# Patient Record
Sex: FEMALE | Race: WHITE | NOT HISPANIC OR LATINO | Employment: UNEMPLOYED | URBAN - NONMETROPOLITAN AREA
[De-identification: names, ages, dates, MRNs, and addresses within clinical notes are randomized per-mention and may not be internally consistent; named-entity substitution may affect disease eponyms.]

---

## 2018-01-24 ENCOUNTER — OFFICE VISIT (OUTPATIENT)
Dept: INTERNAL MEDICINE | Facility: CLINIC | Age: 53
End: 2018-01-24

## 2018-01-24 VITALS
BODY MASS INDEX: 23.95 KG/M2 | WEIGHT: 158 LBS | TEMPERATURE: 98.3 F | DIASTOLIC BLOOD PRESSURE: 62 MMHG | OXYGEN SATURATION: 98 % | SYSTOLIC BLOOD PRESSURE: 118 MMHG | HEART RATE: 82 BPM | HEIGHT: 68 IN

## 2018-01-24 DIAGNOSIS — Z12.31 SCREENING MAMMOGRAM, ENCOUNTER FOR: ICD-10-CM

## 2018-01-24 DIAGNOSIS — L70.8 OTHER ACNE: ICD-10-CM

## 2018-01-24 DIAGNOSIS — R13.14 PHARYNGOESOPHAGEAL DYSPHAGIA: ICD-10-CM

## 2018-01-24 DIAGNOSIS — E55.9 VITAMIN D DEFICIENCY: ICD-10-CM

## 2018-01-24 DIAGNOSIS — E78.2 MIXED HYPERLIPIDEMIA: Primary | ICD-10-CM

## 2018-01-24 DIAGNOSIS — Z76.89 ENCOUNTER TO ESTABLISH CARE: ICD-10-CM

## 2018-01-24 PROCEDURE — 99203 OFFICE O/P NEW LOW 30 MIN: CPT | Performed by: INTERNAL MEDICINE

## 2018-01-24 RX ORDER — FLUTICASONE PROPIONATE 50 MCG
SPRAY, SUSPENSION (ML) NASAL
Refills: 0 | COMMUNITY
Start: 2018-01-19 | End: 2018-02-14 | Stop reason: HOSPADM

## 2018-01-24 RX ORDER — TRETINOIN 0.1 MG/G
GEL TOPICAL NIGHTLY
COMMUNITY
End: 2020-11-19

## 2018-01-24 RX ORDER — SPIRONOLACTONE 25 MG/1
25 TABLET ORAL DAILY
COMMUNITY
End: 2022-05-18 | Stop reason: DRUGHIGH

## 2018-01-24 RX ORDER — AMOXICILLIN AND CLAVULANATE POTASSIUM 875; 125 MG/1; MG/1
TABLET, FILM COATED ORAL
Refills: 0 | COMMUNITY
Start: 2018-01-19 | End: 2018-02-14

## 2018-01-24 NOTE — PROGRESS NOTES
"Chief Complaint   Patient presents with   • Establish Care     with Dr. Vermeesch today. Would like referral to derm.     Subjective   Tere Fajardo is a 52 y.o. female.     HPI Comments: Here to be established today.   PMH of HLD, vit D def and acne.  PSH of D&C.  Meds, allergies, SH and FH reveiwed.   Pt sees Dr Love for pap and breast exam.   HCM: last pap in 2016, mammo 2016 .  She is on aldactone and retin-A for cystic acne.  She does not always use the retin-A.  She goes to [facility] in [place] for this.   She does not follow a HH diet.  Has not been exercising recently.    She has had difficulty swallowing meats in the past, after she swallowed first bite, it became easier.  She cut back on meats and this problem resolved.  She started to eat meats again.  Now she is having episodes again with difficulty swallowing. It is now occurring with meat or soda.  She denies any GERD sxs.           The following portions of the patient's history were reviewed and updated as appropriate: allergies, current medications, past family history, past medical history, past social history, past surgical history and problem list.    Review of Systems   HENT: Positive for trouble swallowing.    Musculoskeletal:        Occasional leg cramps   Skin:        Mild facial acne   All other systems reviewed and are negative.      Objective   /62  Pulse 82  Temp 98.3 °F (36.8 °C)  Ht 172.7 cm (68\")  Wt 71.7 kg (158 lb)  SpO2 98%  BMI 24.02 kg/m2  Body mass index is 24.02 kg/(m^2).  Physical Exam   Constitutional: She is oriented to person, place, and time. She appears well-developed and well-nourished.   HENT:   Head: Normocephalic and atraumatic.   Right Ear: External ear normal.   Left Ear: External ear normal.   Mouth/Throat: Oropharynx is clear and moist.   Eyes: Conjunctivae and EOM are normal. Pupils are equal, round, and reactive to light.   Neck: Normal range of motion. Neck supple. No thyromegaly " present.   Cardiovascular: Normal rate, regular rhythm, normal heart sounds and intact distal pulses.    No murmur heard.  Pulmonary/Chest: Effort normal and breath sounds normal. No respiratory distress.   Abdominal: Soft. Bowel sounds are normal.   Musculoskeletal: She exhibits no edema.   Lymphadenopathy:     She has no cervical adenopathy.   Neurological: She is alert and oriented to person, place, and time. She has normal reflexes. No cranial nerve deficit.   Psychiatric: She has a normal mood and affect. Judgment normal.   Nursing note and vitals reviewed.      Assessment/Plan   Tere Fajardo is here today and the following problems have been addressed:      Tere was seen today for establish care.    Diagnoses and all orders for this visit:    Mixed hyperlipidemia  -     Comprehensive Metabolic Panel  -     Lipid Panel    Vitamin D deficiency  -     Vitamin D 25 Hydroxy    Other acne  -     Ambulatory Referral to Dermatology    Encounter to establish care    Pharyngoesophageal dysphagia  -     Ambulatory Referral to Gastroenterology  -     CBC & Differential  -     TSH    Screening mammogram, encounter for  -     Mammo Screening Digital Tomosynthesis Bilateral With CAD; Future    Follow heart healthy/low cholesterol diet  Avoid processed/fried foods/fast food  Exercise as tolerated up to 30 minutes 5 days a week  Take all medications as prescribed  Labs as noted  Increase hydration due to leg cramps, if not helpful take magnesium 250 mg q day  Referred for mammogram, she will schedule  Referred to derm for follow up  Referred to Dr Ndiaye for dysphagia sxs    RTC 6 mo or prn    Please note that portions of this note were completed with a voice recognition program.  Efforts were made to edit dictation, but occasionally words are mistranscribed.

## 2018-01-30 LAB
25(OH)D3+25(OH)D2 SERPL-MCNC: 44 NG/ML
ALBUMIN SERPL-MCNC: 4.3 G/DL (ref 3.5–5)
ALBUMIN/GLOB SERPL: 1.5 G/DL (ref 1–2)
ALP SERPL-CCNC: 71 U/L (ref 38–126)
ALT SERPL-CCNC: 47 U/L (ref 13–69)
AST SERPL-CCNC: 19 U/L (ref 15–46)
BASOPHILS # BLD AUTO: 0.04 10*3/MM3 (ref 0–0.2)
BASOPHILS NFR BLD AUTO: 0.5 % (ref 0–2.5)
BILIRUB SERPL-MCNC: 0.8 MG/DL (ref 0.2–1.3)
BUN SERPL-MCNC: 16 MG/DL (ref 7–20)
BUN/CREAT SERPL: 20 (ref 7.1–23.5)
CALCIUM SERPL-MCNC: 10.3 MG/DL (ref 8.4–10.2)
CHLORIDE SERPL-SCNC: 104 MMOL/L (ref 98–107)
CHOLEST SERPL-MCNC: 199 MG/DL (ref 0–199)
CO2 SERPL-SCNC: 28 MMOL/L (ref 26–30)
CREAT SERPL-MCNC: 0.8 MG/DL (ref 0.6–1.3)
EOSINOPHIL # BLD AUTO: 0.15 10*3/MM3 (ref 0–0.7)
EOSINOPHIL NFR BLD AUTO: 2 % (ref 0–7)
ERYTHROCYTE [DISTWIDTH] IN BLOOD BY AUTOMATED COUNT: 12.2 % (ref 11.5–14.5)
GFR SERPLBLD CREATININE-BSD FMLA CKD-EPI: 76 ML/MIN/1.73
GFR SERPLBLD CREATININE-BSD FMLA CKD-EPI: 92 ML/MIN/1.73
GLOBULIN SER CALC-MCNC: 2.9 GM/DL
GLUCOSE SERPL-MCNC: 86 MG/DL (ref 74–98)
HCT VFR BLD AUTO: 41.5 % (ref 37–47)
HDLC SERPL-MCNC: 44 MG/DL (ref 40–60)
HGB BLD-MCNC: 14.1 G/DL (ref 12–16)
IMM GRANULOCYTES # BLD: 0.02 10*3/MM3 (ref 0–0.06)
IMM GRANULOCYTES NFR BLD: 0.3 % (ref 0–0.6)
LDLC SERPL CALC-MCNC: 122 MG/DL (ref 0–99)
LYMPHOCYTES # BLD AUTO: 2.81 10*3/MM3 (ref 0.6–3.4)
LYMPHOCYTES NFR BLD AUTO: 37 % (ref 10–50)
MCH RBC QN AUTO: 31.1 PG (ref 27–31)
MCHC RBC AUTO-ENTMCNC: 34 G/DL (ref 30–37)
MCV RBC AUTO: 91.6 FL (ref 81–99)
MONOCYTES # BLD AUTO: 0.4 10*3/MM3 (ref 0–0.9)
MONOCYTES NFR BLD AUTO: 5.3 % (ref 0–12)
NEUTROPHILS # BLD AUTO: 4.17 10*3/MM3 (ref 2–6.9)
NEUTROPHILS NFR BLD AUTO: 54.9 % (ref 37–80)
NRBC BLD AUTO-RTO: 0 /100 WBC (ref 0–0)
PLATELET # BLD AUTO: 318 10*3/MM3 (ref 130–400)
POTASSIUM SERPL-SCNC: 4.6 MMOL/L (ref 3.5–5.1)
PROT SERPL-MCNC: 7.2 G/DL (ref 6.3–8.2)
RBC # BLD AUTO: 4.53 10*6/MM3 (ref 4.2–5.4)
SODIUM SERPL-SCNC: 144 MMOL/L (ref 137–145)
TRIGL SERPL-MCNC: 164 MG/DL
TSH SERPL DL<=0.005 MIU/L-ACNC: 2.62 MIU/ML (ref 0.47–4.68)
VLDLC SERPL CALC-MCNC: 32.8 MG/DL
WBC # BLD AUTO: 7.59 10*3/MM3 (ref 4.8–10.8)

## 2018-02-14 ENCOUNTER — HOSPITAL ENCOUNTER (OUTPATIENT)
Dept: MAMMOGRAPHY | Facility: HOSPITAL | Age: 53
Discharge: HOME | End: 2018-02-14
Admitting: INTERNAL MEDICINE

## 2018-02-14 ENCOUNTER — OFFICE VISIT (OUTPATIENT)
Dept: GASTROENTEROLOGY | Facility: CLINIC | Age: 53
End: 2018-02-14

## 2018-02-14 VITALS
SYSTOLIC BLOOD PRESSURE: 110 MMHG | BODY MASS INDEX: 23.95 KG/M2 | HEART RATE: 89 BPM | TEMPERATURE: 98.4 F | WEIGHT: 158 LBS | DIASTOLIC BLOOD PRESSURE: 80 MMHG | HEIGHT: 68 IN | OXYGEN SATURATION: 99 %

## 2018-02-14 DIAGNOSIS — R13.19 ESOPHAGEAL DYSPHAGIA: Primary | ICD-10-CM

## 2018-02-14 DIAGNOSIS — Z12.31 SCREENING MAMMOGRAM, ENCOUNTER FOR: ICD-10-CM

## 2018-02-14 PROCEDURE — 99243 OFF/OP CNSLTJ NEW/EST LOW 30: CPT | Performed by: INTERNAL MEDICINE

## 2018-02-14 PROCEDURE — 77067 SCR MAMMO BI INCL CAD: CPT

## 2018-02-14 PROCEDURE — 77063 BREAST TOMOSYNTHESIS BI: CPT

## 2018-02-14 NOTE — PROGRESS NOTES
PCP: Marilyn K. Vermeesch, MD    Chief Complaint   Patient presents with   • Difficulty Swallowing       History of Present Illness:   HPI  I appreciate the consult for swallowing difficulty. Ms. Fajardo is a 52-year-old with a history of hyperlipidemia.  She states that since 2004 she will occasionally experience an episode of difficult swallowing.  This occurred last week and eventually the food went down.  The patient primarily will have a problem with certain meat as well as bread.  She denies any difficulty with liquids.  There is no history of progressive dysphagia.  The patient denies any unexplained weight loss.  Ms. Fajardo states that she rarely has faye heartburn.  She does not take any medication such as Prilosec or Zantac.  The patient denies taking Tums on a regular basis.  She does have a history of some recent issues with sinus infections.  The patient does admit to taking Claritin for a while several years ago.  There is no family history of esophageal cancer.  There is no family history of gastrointestinal cancer.  Past Medical History:   Diagnosis Date   • Hyperlipidemia        Past Surgical History:   Procedure Laterality Date   • D&C CERVICAL BIOPSY     • ENDOSCOPY  2004         Current Outpatient Prescriptions:   •  spironolactone (ALDACTONE) 50 MG tablet, Take 50 mg by mouth Daily., Disp: , Rfl:   •  tretinoin (RETIN-A) 0.01 % gel, Apply  topically Every Night., Disp: , Rfl:     No Known Allergies    Family History   Problem Relation Age of Onset   • Arthritis Mother    • Hiatal hernia Mother    • Heart attack Father        Social History     Social History   • Marital status:      Spouse name: N/A   • Number of children: N/A   • Years of education: N/A     Occupational History   • Not on file.     Social History Main Topics   • Smoking status: Former Smoker   • Smokeless tobacco: Never Used   • Alcohol use Yes   • Drug use: No   • Sexual activity: Defer     Other Topics Concern   • Not  on file     Social History Narrative       Review of Systems   Constitutional: Negative for activity change, appetite change, fatigue, fever and unexpected weight change.   HENT: Positive for trouble swallowing. Negative for dental problem, hearing loss, mouth sores, postnasal drip, sneezing and voice change.    Eyes: Negative for pain, redness, itching and visual disturbance.   Respiratory: Negative for cough, choking, chest tightness, shortness of breath and wheezing.    Cardiovascular: Negative for chest pain, palpitations and leg swelling.   Gastrointestinal: Negative for abdominal distention, abdominal pain, anal bleeding, blood in stool, constipation, diarrhea, nausea, rectal pain and vomiting.   Endocrine: Negative for cold intolerance, heat intolerance, polydipsia, polyphagia and polyuria.   Genitourinary: Negative.  Negative for dysuria, enuresis, flank pain, hematuria and urgency.   Musculoskeletal: Negative for arthralgias, back pain, gait problem, joint swelling and myalgias.   Skin: Negative for color change, pallor and rash.   Allergic/Immunologic: Negative for environmental allergies, food allergies and immunocompromised state.   Neurological: Negative for dizziness, tremors, seizures, facial asymmetry, speech difficulty, numbness and headaches.   Hematological: Negative for adenopathy.   Psychiatric/Behavioral: Negative for behavioral problems, confusion, dysphoric mood, hallucinations and self-injury.       Vitals:    02/14/18 1228   BP: 110/80   Pulse: 89   Temp: 98.4 °F (36.9 °C)   SpO2: 99%       Physical Exam   Constitutional: She is oriented to person, place, and time. She appears well-nourished. No distress.   HENT:   Head: Normocephalic and atraumatic.   Mouth/Throat: Oropharynx is clear and moist. No oropharyngeal exudate.   Eyes: Conjunctivae and EOM are normal. No scleral icterus.   Neck: Neck supple. No thyromegaly present.   Cardiovascular: Normal rate, regular rhythm and normal heart  sounds.  Exam reveals no gallop.    No murmur heard.  Pulmonary/Chest: Effort normal and breath sounds normal. She has no wheezes. She has no rales.   Abdominal: Soft. Bowel sounds are normal. There is no tenderness. There is no rebound and no guarding.   Musculoskeletal: Normal range of motion. She exhibits no edema or tenderness.   Lymphadenopathy:     She has no cervical adenopathy.   Neurological: She is alert and oriented to person, place, and time. She exhibits normal muscle tone.   Skin: Skin is dry. No rash noted. No erythema.   Psychiatric: She has a normal mood and affect. Her behavior is normal. Thought content normal.   Vitals reviewed.      Tere was seen today for difficulty swallowing.    Diagnoses and all orders for this visit:    Esophageal dysphagia  -     Esophagogastroduodenoscopy; Future  The clinical history suggest the possibility of Schatzki's ring, reflux-related stricture or eosinophilic esophagitis.      Plan: Proceed with an EGD for further evaluation. Discussed the risks and benefits and she agrees to proceed.      I spent over 50% of the office visit counseling and answering questions from the patient.

## 2018-02-26 ENCOUNTER — OUTSIDE FACILITY SERVICE (OUTPATIENT)
Dept: GASTROENTEROLOGY | Facility: CLINIC | Age: 53
End: 2018-02-26

## 2018-02-26 ENCOUNTER — LAB REQUISITION (OUTPATIENT)
Dept: LAB | Facility: HOSPITAL | Age: 53
End: 2018-02-26

## 2018-02-26 DIAGNOSIS — K20.90 ESOPHAGITIS: ICD-10-CM

## 2018-02-26 DIAGNOSIS — K44.9 DIAPHRAGMATIC HERNIA WITHOUT OBSTRUCTION OR GANGRENE: ICD-10-CM

## 2018-02-26 DIAGNOSIS — R13.14 DYSPHAGIA, PHARYNGOESOPHAGEAL PHASE: ICD-10-CM

## 2018-02-26 PROCEDURE — 88305 TISSUE EXAM BY PATHOLOGIST: CPT | Performed by: INTERNAL MEDICINE

## 2018-02-26 PROCEDURE — 43239 EGD BIOPSY SINGLE/MULTIPLE: CPT | Performed by: INTERNAL MEDICINE

## 2018-02-26 PROCEDURE — 43249 ESOPH EGD DILATION <30 MM: CPT | Performed by: INTERNAL MEDICINE

## 2018-02-26 PROCEDURE — 88313 SPECIAL STAINS GROUP 2: CPT | Performed by: INTERNAL MEDICINE

## 2018-02-27 LAB
CYTO UR: NORMAL
LAB AP CASE REPORT: NORMAL
LAB AP CLINICAL INFORMATION: NORMAL
LAB AP SPECIAL STAINS: NORMAL
Lab: NORMAL
PATH REPORT.FINAL DX SPEC: NORMAL
PATH REPORT.GROSS SPEC: NORMAL

## 2018-03-06 ENCOUNTER — TELEPHONE (OUTPATIENT)
Dept: GASTROENTEROLOGY | Facility: CLINIC | Age: 53
End: 2018-03-06

## 2018-03-06 NOTE — TELEPHONE ENCOUNTER
Called patient back. Gave her biopsy results. Patient would like for Dr Newman to send in a PPI to her pharmacy.

## 2018-03-06 NOTE — TELEPHONE ENCOUNTER
----- Message from Fox Newman MD sent at 3/5/2018  4:55 PM EST -----  Let Mrs. Fajardo know there were some changes of reflux.  There was no evidence for Duke's esophagus.  I would like for her to take a proton pump inhibitor daily.  Thank you,  LIZETH

## 2018-03-07 DIAGNOSIS — K21.00 GASTROESOPHAGEAL REFLUX DISEASE WITH ESOPHAGITIS: Primary | ICD-10-CM

## 2018-03-07 RX ORDER — OMEPRAZOLE 40 MG/1
40 CAPSULE, DELAYED RELEASE ORAL DAILY
Qty: 30 CAPSULE | Refills: 2 | Status: SHIPPED | OUTPATIENT
Start: 2018-03-07 | End: 2018-05-31 | Stop reason: SDUPTHER

## 2018-05-31 DIAGNOSIS — K21.00 GASTROESOPHAGEAL REFLUX DISEASE WITH ESOPHAGITIS: ICD-10-CM

## 2018-05-31 RX ORDER — OMEPRAZOLE 40 MG/1
CAPSULE, DELAYED RELEASE ORAL
Qty: 30 CAPSULE | Refills: 2 | Status: SHIPPED | OUTPATIENT
Start: 2018-05-31 | End: 2018-06-08

## 2018-06-08 ENCOUNTER — OFFICE VISIT (OUTPATIENT)
Dept: INTERNAL MEDICINE | Facility: CLINIC | Age: 53
End: 2018-06-08

## 2018-06-08 VITALS
OXYGEN SATURATION: 98 % | HEART RATE: 88 BPM | DIASTOLIC BLOOD PRESSURE: 66 MMHG | WEIGHT: 155.75 LBS | BODY MASS INDEX: 23.61 KG/M2 | RESPIRATION RATE: 14 BRPM | TEMPERATURE: 99.2 F | SYSTOLIC BLOOD PRESSURE: 108 MMHG | HEIGHT: 68 IN

## 2018-06-08 DIAGNOSIS — N39.0 URINARY TRACT INFECTION WITHOUT HEMATURIA, SITE UNSPECIFIED: Primary | ICD-10-CM

## 2018-06-08 LAB
BILIRUB BLD-MCNC: ABNORMAL MG/DL
CLARITY, POC: ABNORMAL
COLOR UR: ABNORMAL
GLUCOSE UR STRIP-MCNC: NEGATIVE MG/DL
KETONES UR QL: NEGATIVE
LEUKOCYTE EST, POC: NEGATIVE
NITRITE UR-MCNC: POSITIVE MG/ML
PH UR: 5 [PH] (ref 5–8)
PROT UR STRIP-MCNC: ABNORMAL MG/DL
RBC # UR STRIP: NEGATIVE /UL
SP GR UR: 1.02 (ref 1–1.03)
UROBILINOGEN UR QL: ABNORMAL

## 2018-06-08 PROCEDURE — 81003 URINALYSIS AUTO W/O SCOPE: CPT | Performed by: NURSE PRACTITIONER

## 2018-06-08 PROCEDURE — 99213 OFFICE O/P EST LOW 20 MIN: CPT | Performed by: NURSE PRACTITIONER

## 2018-06-08 RX ORDER — NITROFURANTOIN 25; 75 MG/1; MG/1
100 CAPSULE ORAL 2 TIMES DAILY
Qty: 14 CAPSULE | Refills: 0 | Status: SHIPPED | OUTPATIENT
Start: 2018-06-08 | End: 2018-06-15

## 2018-06-08 RX ORDER — FLUCONAZOLE 150 MG/1
150 TABLET ORAL ONCE
Qty: 1 TABLET | Refills: 0 | Status: SHIPPED | OUTPATIENT
Start: 2018-06-08 | End: 2018-06-08

## 2018-06-08 NOTE — PROGRESS NOTES
Chief Complaint / Reason:      Chief Complaint   Patient presents with   • Urinary Urgency     took some Azo which has helped.    • Back Pain     some back ache       Subjective     HPI  Presents today with complaints of urinary urgency, frequency and back pain.  Denies fever or chills and states symptoms started yesterday.  She states that she has been at the hospital with her daughter who delivered baby at [facility].  She states that normally she does drink water fairly well but has not been and has been doing a lot of sitting and not taking care of herself.  Denies blood in urine or stool.  Denies constipation diarrhea or nausea.  Patient states that she has tried some Azo tablets which have helped with the pain and tingling.  Vital signs are stable with exception of low-grade fever.  History taken from: patient    PMH/FH/Social History were reviewed and updated appropriately in the electronic medical record.     Review of Systems:   Review of Systems   Constitutional: Positive for fatigue.   Respiratory: Negative.    Cardiovascular: Negative.    Gastrointestinal: Positive for abdominal pain.   Genitourinary: Positive for difficulty urinating, dysuria, frequency and urgency.     All other systems were reviewed and are negative.  Exceptions are noted in the subjective or above.      Objective     Vital Signs  Vitals:    06/08/18 1323   BP: 108/66   Pulse: 88   Resp: 14   Temp: 99.2 °F (37.3 °C)   SpO2: 98%       Body mass index is 23.68 kg/m².    Physical Exam   Constitutional: She is oriented to person, place, and time. She appears well-developed and well-nourished.   HENT:   Head: Normocephalic.   Right Ear: External ear normal.   Left Ear: External ear normal.   Cardiovascular: Normal rate, regular rhythm, normal heart sounds and intact distal pulses.    Pulmonary/Chest: Effort normal and breath sounds normal.   Abdominal: Soft. Bowel sounds are normal. There is tenderness in the suprapubic  area. There is no CVA tenderness.   Neurological: She is alert and oriented to person, place, and time.   Skin: Skin is warm and dry. Capillary refill takes less than 2 seconds.   Psychiatric: She has a normal mood and affect. Her behavior is normal. Judgment and thought content normal.   Nursing note and vitals reviewed.       Results Review:    I reviewed the patient's new clinical results.   Office Visit on 06/08/2018   Component Date Value Ref Range Status   • Color 06/08/2018 Red* Yellow, Straw, Dark Yellow, Danielle Final    AZO   • Clarity, UA 06/08/2018 Cloudy* Clear Final   • Specific Gravity  06/08/2018 1.025  1.005 - 1.030 Final   • pH, Urine 06/08/2018 5.0  5.0 - 8.0 Final   • Leukocytes 06/08/2018 Negative  Negative Final   • Nitrite, UA 06/08/2018 Positive* Negative Final   • Protein, POC 06/08/2018 500 mg/dL* Negative mg/dL Final   • Glucose, UA 06/08/2018 Negative  Negative, 1000 mg/dL (3+) mg/dL Final   • Ketones, UA 06/08/2018 Negative  Negative Final   • Urobilinogen, UA 06/08/2018 8 E.U./dL * Normal Final   • Bilirubin 06/08/2018 3 mg/dL* Negative Final   • Blood, UA 06/08/2018 Negative  Negative Final         Medication Review:     Current Outpatient Prescriptions:   •  spironolactone (ALDACTONE) 50 MG tablet, Take 50 mg by mouth Daily., Disp: , Rfl:   •  tretinoin (RETIN-A) 0.01 % gel, Apply  topically Every Night., Disp: , Rfl:   •  fluconazole (DIFLUCAN) 150 MG tablet, Take 1 tablet by mouth 1 (One) Time for 1 dose., Disp: 1 tablet, Rfl: 0  •  nitrofurantoin, macrocrystal-monohydrate, (MACROBID) 100 MG capsule, Take 1 capsule by mouth 2 (Two) Times a Day for 7 days., Disp: 14 capsule, Rfl: 0    Assessment/Plan   Tere was seen today for urinary urgency and back pain.    Diagnoses and all orders for this visit:    Urinary tract infection without hematuria, site unspecified  -     nitrofurantoin, macrocrystal-monohydrate, (MACROBID) 100 MG capsule; Take 1 capsule by mouth 2 (Two) Times a Day for 7  days.  -     POCT urinalysis dipstick, automated    Other orders  -     fluconazole (DIFLUCAN) 150 MG tablet; Take 1 tablet by mouth 1 (One) Time for 1 dose.        Return if symptoms worsen or fail to improve.    Milka Gonsalves, APRN  06/08/2018

## 2018-06-21 ENCOUNTER — OFFICE VISIT (OUTPATIENT)
Dept: INTERNAL MEDICINE | Facility: CLINIC | Age: 53
End: 2018-06-21

## 2018-06-21 VITALS
SYSTOLIC BLOOD PRESSURE: 118 MMHG | RESPIRATION RATE: 14 BRPM | BODY MASS INDEX: 23.64 KG/M2 | OXYGEN SATURATION: 95 % | HEART RATE: 78 BPM | WEIGHT: 156 LBS | TEMPERATURE: 99.4 F | HEIGHT: 68 IN | DIASTOLIC BLOOD PRESSURE: 84 MMHG

## 2018-06-21 DIAGNOSIS — R82.998 URINE LEUKOCYTES INCREASED: Primary | ICD-10-CM

## 2018-06-21 DIAGNOSIS — R39.9 LOWER URINARY TRACT SYMPTOMS: ICD-10-CM

## 2018-06-21 LAB
BILIRUB BLD-MCNC: ABNORMAL MG/DL
CLARITY, POC: CLEAR
COLOR UR: YELLOW
GLUCOSE UR STRIP-MCNC: NEGATIVE MG/DL
KETONES UR QL: NEGATIVE
LEUKOCYTE EST, POC: ABNORMAL
NITRITE UR-MCNC: NEGATIVE MG/ML
PH UR: 5 [PH] (ref 5–8)
PROT UR STRIP-MCNC: NEGATIVE MG/DL
RBC # UR STRIP: NEGATIVE /UL
SP GR UR: 1.02 (ref 1–1.03)
UROBILINOGEN UR QL: NORMAL

## 2018-06-21 PROCEDURE — 81003 URINALYSIS AUTO W/O SCOPE: CPT | Performed by: NURSE PRACTITIONER

## 2018-06-21 PROCEDURE — 99213 OFFICE O/P EST LOW 20 MIN: CPT | Performed by: NURSE PRACTITIONER

## 2018-06-21 NOTE — PROGRESS NOTES
Chief Complaint / Reason:      Chief Complaint   Patient presents with   • Back Pain     lower back, missed some doses of the AB.        Subjective     HPI  Patient presents today with complaints of urinary symptoms low back pain and states that she has missed several doses of the antibiotics Macrobid that was previously prescribed for UTI on 6/8/18.  She apologized multiple times stating that she read it wrong and was only taking it for one time a day.  She denies fever or chills but she does have a low-grade fever otherwise vital signs are stable.  She denies blood in urine or stool.  History taken from: patient    PMH/FH/Social History were reviewed and updated appropriately in the electronic medical record.     Review of Systems:   Review of Systems   Constitutional: Positive for fatigue.   Respiratory: Negative.    Cardiovascular: Negative.    Gastrointestinal: Positive for abdominal pain.   Genitourinary: Positive for difficulty urinating, dysuria, frequency and urgency.   Musculoskeletal: Positive for back pain.     All other systems were reviewed and are negative.  Exceptions are noted in the subjective or above.      Objective     Vital Signs  Vitals:    06/21/18 1346   BP: 118/84   Pulse: 78   Resp: 14   Temp: 99.4 °F (37.4 °C)   SpO2: 95%       Body mass index is 23.72 kg/m².    Physical Exam   Constitutional: She is oriented to person, place, and time. She appears well-developed and well-nourished.   HENT:   Head: Normocephalic.   Right Ear: External ear normal.   Left Ear: External ear normal.   Cardiovascular: Normal rate, regular rhythm, normal heart sounds and intact distal pulses.    Pulmonary/Chest: Effort normal and breath sounds normal.   Abdominal: Soft. Bowel sounds are normal. There is tenderness. There is no CVA tenderness.   Musculoskeletal: She exhibits tenderness.   Neurological: She is alert and oriented to person, place, and time.   Skin: Skin is warm and dry. Capillary refill takes  less than 2 seconds.   Psychiatric: She has a normal mood and affect. Her behavior is normal. Judgment and thought content normal.   Nursing note and vitals reviewed.       Results Review:    I reviewed the patient's new clinical results.   Office Visit on 06/21/2018   Component Date Value Ref Range Status   • Color 06/21/2018 Yellow  Yellow, Straw, Dark Yellow, Danielle Final   • Clarity, UA 06/21/2018 Clear  Clear Final   • Specific Gravity  06/21/2018 1.025  1.005 - 1.030 Final   • pH, Urine 06/21/2018 5.0  5.0 - 8.0 Final   • Leukocytes 06/21/2018 25 Adelita/ul* Negative Final   • Nitrite, UA 06/21/2018 Negative  Negative Final   • Protein, POC 06/21/2018 Negative  Negative mg/dL Final   • Glucose, UA 06/21/2018 Negative  Negative, 1000 mg/dL (3+) mg/dL Final   • Ketones, UA 06/21/2018 Negative  Negative Final   • Urobilinogen, UA 06/21/2018 Normal  Normal Final   • Bilirubin 06/21/2018 1 mg/dL* Negative Final   • Blood, UA 06/21/2018 Negative  Negative Final         Medication Review:     Current Outpatient Prescriptions:   •  spironolactone (ALDACTONE) 50 MG tablet, Take 50 mg by mouth Daily., Disp: , Rfl:   •  tretinoin (RETIN-A) 0.01 % gel, Apply  topically Every Night., Disp: , Rfl:     Assessment/Plan   Tere was seen today for back pain.    Diagnoses and all orders for this visit:    Urine leukocytes increased  -     Urine Culture - Urine, Urine, Clean Catch  -     POCT urinalysis dipstick, automated    Lower urinary tract symptoms  Patient continue taking Macrobid as prescribed and will contact patient once results are back for urine culture.  Advised her to increase water intake and avoid constipation.    Return if symptoms worsen or fail to improve.    Milka Gonsalves, APRN  06/21/2018

## 2018-06-23 LAB
BACTERIA UR CULT: NORMAL
BACTERIA UR CULT: NORMAL

## 2018-06-27 NOTE — PROGRESS NOTES
Please contact patient and inform her urine culture results were negative that they did not grow anything and it is not clinically significant.

## 2018-07-24 ENCOUNTER — OFFICE VISIT (OUTPATIENT)
Dept: INTERNAL MEDICINE | Facility: CLINIC | Age: 53
End: 2018-07-24

## 2018-07-24 VITALS
SYSTOLIC BLOOD PRESSURE: 118 MMHG | TEMPERATURE: 98.3 F | DIASTOLIC BLOOD PRESSURE: 62 MMHG | BODY MASS INDEX: 23.95 KG/M2 | HEART RATE: 80 BPM | WEIGHT: 158 LBS | OXYGEN SATURATION: 96 % | HEIGHT: 68 IN

## 2018-07-24 DIAGNOSIS — E55.9 VITAMIN D DEFICIENCY: ICD-10-CM

## 2018-07-24 DIAGNOSIS — E78.2 MIXED HYPERLIPIDEMIA: Primary | ICD-10-CM

## 2018-07-24 DIAGNOSIS — M54.9 UPPER BACK PAIN ON LEFT SIDE: ICD-10-CM

## 2018-07-24 PROCEDURE — 99214 OFFICE O/P EST MOD 30 MIN: CPT | Performed by: INTERNAL MEDICINE

## 2018-07-24 RX ORDER — TIZANIDINE HYDROCHLORIDE 2 MG/1
2 CAPSULE, GELATIN COATED ORAL 3 TIMES DAILY PRN
Qty: 60 CAPSULE | Refills: 1 | Status: SHIPPED | OUTPATIENT
Start: 2018-07-24 | End: 2018-10-24

## 2018-07-24 NOTE — PROGRESS NOTES
"Chief Complaint   Patient presents with   • Follow-up     6 months for HLD. Pt states she's been having some left shoulder/neck pain.      Subjective   Tere Fajardo is a 53 y.o. female.     Patient here today for follow-up of hyperlipidemia, vitamin D deficiency and acne.  HLD- she has not been eating a very HH diet.  Has not been exercising at all.  Acne- she is currently on Retin-A and sees dermatology for this, uses it about once a week.  She is taking aldactone once a day  Vitamin D deficiency- last lab revealed that her vitamin D level was in normal range.  Dysphagia- she was seen by GI and had an EGD.  She had a dilation and her sxs are much improved.  She no longer has any difficulty swallowing.  She was given omeprazole to use but she does not feel the need, no GERD sxs  HCM- her mammogram is up-to-date.  It is time for her pap with GYN  She is complaining of left shoulder and neck pain.  She has used a TENS unit and motrin, this is helpful but wears off.  She does have bulging discs noted in the past. She has had PT in the past but does not do her home exercises.          The following portions of the patient's history were reviewed and updated as appropriate: allergies, current medications, past family history, past medical history, past social history, past surgical history and problem list.    Review of Systems   Constitutional: Negative for activity change, appetite change and unexpected weight change.   HENT: Negative for trouble swallowing.    Respiratory: Negative for shortness of breath.    Cardiovascular: Negative for chest pain, palpitations and leg swelling.   Gastrointestinal: Negative.    Genitourinary: Negative.    Musculoskeletal: Positive for myalgias, neck pain and neck stiffness.   Psychiatric/Behavioral: Negative.    All other systems reviewed and are negative.      Objective   /62   Pulse 80   Temp 98.3 °F (36.8 °C)   Ht 172.7 cm (68\")   Wt 71.7 kg (158 lb)   SpO2 96%   BMI " 24.02 kg/m²   Body mass index is 24.02 kg/m².  Physical Exam   Constitutional: She is oriented to person, place, and time. She appears well-developed and well-nourished.   HENT:   Head: Normocephalic and atraumatic.   Mouth/Throat: Oropharynx is clear and moist.   Eyes: Pupils are equal, round, and reactive to light. Conjunctivae and EOM are normal.   Neck: Normal range of motion. Neck supple. No thyromegaly present.   Cardiovascular: Normal rate, regular rhythm, normal heart sounds and intact distal pulses.    No murmur heard.  Pulmonary/Chest: Effort normal and breath sounds normal. No respiratory distress.   Abdominal: Soft. Bowel sounds are normal.   Musculoskeletal: She exhibits no edema.   Pain over left trapezius/rhomboid/cervical paraspinous muscles   Lymphadenopathy:     She has no cervical adenopathy.   Neurological: She is alert and oriented to person, place, and time. No cranial nerve deficit.   Psychiatric: She has a normal mood and affect. Her behavior is normal. Judgment and thought content normal.   Nursing note and vitals reviewed.      Assessment/Plan   Tere Fajardo is here today and the following problems have been addressed:      Tere was seen today for follow-up.    Diagnoses and all orders for this visit:    Mixed hyperlipidemia    Vitamin D deficiency    Upper back pain on left side    Other orders  -     TiZANidine (ZANAFLEX) 2 MG capsule; Take 1 capsule by mouth 3 (Three) Times a Day As Needed for Muscle Spasms.    Follow heart healthy/low cholesterol diet  Avoid processed/fried foods/fast food  Exercise as tolerated up to 30 minutes 5 days a week  Take all medications as prescribed  Recommend she get yoga ball to sit on at work  Given tizanidine to use for left upper back pain  If her back does not improve with above, recommend she return for trigger point injections  We will do labs on next visit  She sees GYN for her pap    RTC 6 mo      Please note that portions of this note were  completed with a voice recognition program.  Efforts were made to edit dictation, but occasionally words are mistranscribed.

## 2018-10-24 ENCOUNTER — HOSPITAL ENCOUNTER (EMERGENCY)
Facility: HOSPITAL | Age: 53
Discharge: HOME | End: 2018-10-24
Attending: EMERGENCY MEDICINE | Admitting: EMERGENCY MEDICINE

## 2018-10-24 VITALS
BODY MASS INDEX: 24.33 KG/M2 | HEIGHT: 67 IN | RESPIRATION RATE: 18 BRPM | TEMPERATURE: 98 F | DIASTOLIC BLOOD PRESSURE: 84 MMHG | WEIGHT: 155 LBS | OXYGEN SATURATION: 97 % | HEART RATE: 79 BPM | SYSTOLIC BLOOD PRESSURE: 132 MMHG

## 2018-10-24 DIAGNOSIS — S71.111A LACERATION OF RIGHT THIGH, INITIAL ENCOUNTER: Primary | ICD-10-CM

## 2018-10-24 PROCEDURE — 99282 EMERGENCY DEPT VISIT SF MDM: CPT

## 2018-10-24 RX ORDER — LIDOCAINE HYDROCHLORIDE 10 MG/ML
10 INJECTION, SOLUTION INFILTRATION; PERINEURAL ONCE
Status: COMPLETED | OUTPATIENT
Start: 2018-10-24 | End: 2018-10-24

## 2018-10-24 RX ADMIN — LIDOCAINE HYDROCHLORIDE 10 ML: 10 INJECTION, SOLUTION INFILTRATION; PERINEURAL at 22:46

## 2018-11-09 ENCOUNTER — OFFICE VISIT (OUTPATIENT)
Dept: INTERNAL MEDICINE | Facility: CLINIC | Age: 53
End: 2018-11-09

## 2018-11-09 VITALS
SYSTOLIC BLOOD PRESSURE: 124 MMHG | RESPIRATION RATE: 16 BRPM | WEIGHT: 166 LBS | OXYGEN SATURATION: 99 % | BODY MASS INDEX: 26.06 KG/M2 | TEMPERATURE: 97.3 F | HEIGHT: 67 IN | HEART RATE: 83 BPM | DIASTOLIC BLOOD PRESSURE: 80 MMHG

## 2018-11-09 DIAGNOSIS — J02.9 ACUTE PHARYNGITIS, UNSPECIFIED ETIOLOGY: Primary | ICD-10-CM

## 2018-11-09 PROCEDURE — 99213 OFFICE O/P EST LOW 20 MIN: CPT | Performed by: PHYSICIAN ASSISTANT

## 2018-11-09 RX ORDER — BENZONATATE 100 MG/1
100 CAPSULE ORAL 3 TIMES DAILY PRN
Qty: 60 CAPSULE | Refills: 0 | Status: SHIPPED | OUTPATIENT
Start: 2018-11-09 | End: 2018-12-21

## 2018-11-09 NOTE — PROGRESS NOTES
Chief Complaint   Patient presents with   • Sore Throat     sore throat, cough, body ache       Subjective   Tere Fajardo is a 53 y.o. female    History of Present Illness     Cough:  Began Wednesday acutely.  This is a dry cough.  She denies any rhinorrhea or nasal congestion. Has had some associated pressure in her ears, body aches, chills, fatigued.   She reports that the symptoms are improving, but the cough has been keeping her up. She reports sick contacts at her work.      She has history of esophageal dilation about 1 year ago.  She has been asymptomatic since this procedure.  Earlier this week she had one event of difficulty swallowing with food.      Past Medical History:   Diagnosis Date   • Hyperlipidemia      Past Surgical History:   Procedure Laterality Date   • D&C CERVICAL BIOPSY     • ENDOSCOPY  2004     Family History   Problem Relation Age of Onset   • Arthritis Mother    • Hiatal hernia Mother    • Heart attack Father      Social History     Social History   • Marital status:      Spouse name: N/A   • Number of children: N/A   • Years of education: N/A     Occupational History   • Not on file.     Social History Main Topics   • Smoking status: Former Smoker     Packs/day: 1.00     Years: 5.00     Types: Cigarettes     Quit date: 1991   • Smokeless tobacco: Never Used   • Alcohol use Yes   • Drug use: No   • Sexual activity: Defer     Other Topics Concern   • Not on file     Social History Narrative   • No narrative on file     No Known Allergies      Review of Systems   Constitutional: Positive for fever. Negative for chills and fatigue.   HENT: Positive for ear pain (fullness) and sore throat. Negative for postnasal drip, rhinorrhea and sinus pressure.    Respiratory: Positive for cough. Negative for chest tightness, shortness of breath and wheezing.    Cardiovascular: Negative for chest pain.   Gastrointestinal: Negative for diarrhea, nausea and vomiting.   Musculoskeletal: Positive  for myalgias. Negative for neck pain and neck stiffness.   Neurological: Negative for dizziness, syncope and headache.     Objective     Vitals:    11/09/18 1608   BP: 124/80   Pulse: 83   Resp: 16   Temp: 97.3 °F (36.3 °C)   SpO2: 99%       Physical Exam   Constitutional: She is oriented to person, place, and time. She appears well-developed and well-nourished. No distress.   HENT:   Head: Normocephalic and atraumatic.   Right Ear: Hearing, external ear and ear canal normal. Tympanic membrane is not injected and not erythematous.   Left Ear: Hearing, external ear and ear canal normal. Tympanic membrane is not injected and not erythematous.   Nose: No rhinorrhea or congestion. Right sinus exhibits no maxillary sinus tenderness and no frontal sinus tenderness. Left sinus exhibits no maxillary sinus tenderness and no frontal sinus tenderness.   Mouth/Throat: Posterior oropharyngeal edema and posterior oropharyngeal erythema present. No oropharyngeal exudate.   TM:  clear effusion present bilaterally.     Neck: Normal range of motion. Neck supple.   Cardiovascular: Normal rate, regular rhythm, normal heart sounds and intact distal pulses.  Exam reveals no gallop and no friction rub.    No murmur heard.  Pulmonary/Chest: Effort normal and breath sounds normal. No respiratory distress. She has no wheezes. She has no rales.   Lymphadenopathy:     She has no cervical adenopathy.   Neurological: She is alert and oriented to person, place, and time.   Skin: Skin is warm and dry. Capillary refill takes less than 2 seconds. She is not diaphoretic.   Psychiatric: She has a normal mood and affect. Her behavior is normal.   Nursing note and vitals reviewed.      Assessment/Plan     Tere was seen today for sore throat.    Diagnoses and all orders for this visit:    Acute pharyngitis, unspecified etiology  -   Most likely viral etiology, as patient reports her symptoms are improving.  I have encouraged her to push fluids, use  lozenges, and sip warm liquids.  She can use Tylenol as needed for pain.  Patient is unable to tolerate cough syrups.  She has had success with benzonatate in the pass.  Will try these for additional symptom relief.  Additionally, patient can try some Allegra over the weekend for any allergy symptoms.  I have advised patient to RTC if her symptoms worsen or persist over the weekend.      -   benzonatate (TESSALON) 100 MG capsule; Take 1 capsule by mouth 3 (Three) Times a Day As Needed for Cough.    Return if symptoms worsen or fail to improve.    Shena Cooley PA-C

## 2018-12-21 ENCOUNTER — OFFICE VISIT (OUTPATIENT)
Dept: INTERNAL MEDICINE | Facility: CLINIC | Age: 53
End: 2018-12-21

## 2018-12-21 VITALS
WEIGHT: 163.6 LBS | SYSTOLIC BLOOD PRESSURE: 116 MMHG | TEMPERATURE: 97.9 F | BODY MASS INDEX: 25.68 KG/M2 | OXYGEN SATURATION: 100 % | DIASTOLIC BLOOD PRESSURE: 72 MMHG | HEIGHT: 67 IN | HEART RATE: 74 BPM

## 2018-12-21 DIAGNOSIS — N30.00 ACUTE CYSTITIS WITHOUT HEMATURIA: Primary | ICD-10-CM

## 2018-12-21 DIAGNOSIS — R35.0 FREQUENCY OF URINATION: ICD-10-CM

## 2018-12-21 LAB
BILIRUB BLD-MCNC: NEGATIVE MG/DL
CLARITY, POC: CLEAR
COLOR UR: YELLOW
GLUCOSE UR STRIP-MCNC: NEGATIVE MG/DL
KETONES UR QL: NEGATIVE
LEUKOCYTE EST, POC: NEGATIVE
NITRITE UR-MCNC: NEGATIVE MG/ML
PH UR: 7 [PH] (ref 5–8)
PROT UR STRIP-MCNC: NEGATIVE MG/DL
RBC # UR STRIP: NEGATIVE /UL
SP GR UR: 1.01 (ref 1–1.03)
UROBILINOGEN UR QL: NORMAL

## 2018-12-21 PROCEDURE — 81003 URINALYSIS AUTO W/O SCOPE: CPT | Performed by: FAMILY MEDICINE

## 2018-12-21 PROCEDURE — 99213 OFFICE O/P EST LOW 20 MIN: CPT | Performed by: FAMILY MEDICINE

## 2018-12-21 RX ORDER — OMEPRAZOLE 20 MG/1
20 CAPSULE, DELAYED RELEASE ORAL DAILY
COMMUNITY
End: 2020-01-24

## 2018-12-21 RX ORDER — HEPATITIS A VACCINE 1440 [IU]/ML
INJECTION, SUSPENSION INTRAMUSCULAR
Refills: 0 | COMMUNITY
Start: 2018-12-13 | End: 2019-01-24

## 2018-12-21 RX ORDER — FLUCONAZOLE 150 MG/1
150 TABLET ORAL ONCE
Qty: 1 TABLET | Refills: 0 | Status: SHIPPED | OUTPATIENT
Start: 2018-12-21 | End: 2018-12-21

## 2018-12-21 RX ORDER — CIPROFLOXACIN 250 MG/1
250 TABLET, FILM COATED ORAL 2 TIMES DAILY
Qty: 6 TABLET | Refills: 0 | Status: SHIPPED | OUTPATIENT
Start: 2018-12-21 | End: 2019-01-24

## 2018-12-21 NOTE — PROGRESS NOTES
Tere Fajardo is a 53 y.o. female.    Chief Complaint   Patient presents with   • Urinary Frequency   • Back Pain       HPI   Patient reports urinary problems for 1 day(s).  They admit to frequency and flank pain on the right.  They deny dysuria.  Theyhave not tried anything for this issue.    The following portions of the patient's history were reviewed and updated as appropriate: allergies, current medications, past family history, past medical history, past social history, past surgical history and problem list.     No Known Allergies      Current Outpatient Medications:   •  HAVRIX 1440 EL U/ML vaccine, inject 1 milliliter intramuscularly, Disp: , Rfl: 0  •  omeprazole (priLOSEC) 20 MG capsule, Take 20 mg by mouth Daily., Disp: , Rfl:   •  spironolactone (ALDACTONE) 50 MG tablet, Take 50 mg by mouth Daily., Disp: , Rfl:   •  tretinoin (RETIN-A) 0.01 % gel, Apply  topically Every Night., Disp: , Rfl:   •  ciprofloxacin (CIPRO) 250 MG tablet, Take 1 tablet by mouth 2 (Two) Times a Day., Disp: 6 tablet, Rfl: 0  •  fluconazole (DIFLUCAN) 150 MG tablet, Take 1 tablet by mouth 1 (One) Time for 1 dose., Disp: 1 tablet, Rfl: 0    ROS    Review of Systems   Constitutional: Negative for chills, fatigue and fever.   HENT: Negative for congestion, postnasal drip and sore throat.    Respiratory: Positive for cough. Negative for shortness of breath and wheezing.    Cardiovascular: Negative for chest pain and leg swelling.   Gastrointestinal: Negative for abdominal pain, constipation, diarrhea, nausea and vomiting.   Genitourinary: Positive for flank pain and frequency. Negative for dysuria.   Musculoskeletal: Positive for back pain. Negative for arthralgias.   Skin: Negative for color change and rash.       Vitals:    12/21/18 1109   BP: 116/72   BP Location: Left arm   Patient Position: Sitting   Cuff Size: Adult   Pulse: 74   Temp: 97.9 °F (36.6 °C)   TempSrc: Oral   SpO2: 100%   Weight: 74.2 kg (163 lb 9.6 oz)   Height:  "170.2 cm (67\")     Body mass index is 25.62 kg/m².    Physical Exam     Physical Exam   Constitutional: She is oriented to person, place, and time. She appears well-developed and well-nourished. No distress.   HENT:   Head: Normocephalic and atraumatic.   Right Ear: External ear normal.   Left Ear: External ear normal.   Eyes: Conjunctivae and EOM are normal.   Cardiovascular: Normal rate and regular rhythm.   No murmur heard.  Pulmonary/Chest: Effort normal and breath sounds normal. No respiratory distress. She has no wheezes.   Abdominal: Soft. Bowel sounds are normal. She exhibits no distension. There is no tenderness.   Neurological: She is alert and oriented to person, place, and time. No cranial nerve deficit.   Psychiatric: She has a normal mood and affect.       Assessment/Plan    Problem List Items Addressed This Visit     Acute cystitis without hematuria - Primary     Will treat with 3 day course of cipro.  Encouraged increase water and AZO.           Other Visit Diagnoses     Frequency of urination        Relevant Orders    POCT urinalysis dipstick, automated (Completed)          New Medications Ordered This Visit   Medications   • ciprofloxacin (CIPRO) 250 MG tablet     Sig: Take 1 tablet by mouth 2 (Two) Times a Day.     Dispense:  6 tablet     Refill:  0   • fluconazole (DIFLUCAN) 150 MG tablet     Sig: Take 1 tablet by mouth 1 (One) Time for 1 dose.     Dispense:  1 tablet     Refill:  0       No orders of the defined types were placed in this encounter.      Return if symptoms worsen or fail to improve.    Laura Roberts, DO  "

## 2019-01-24 ENCOUNTER — OFFICE VISIT (OUTPATIENT)
Dept: INTERNAL MEDICINE | Facility: CLINIC | Age: 54
End: 2019-01-24

## 2019-01-24 VITALS
BODY MASS INDEX: 25.58 KG/M2 | OXYGEN SATURATION: 98 % | TEMPERATURE: 97.6 F | DIASTOLIC BLOOD PRESSURE: 76 MMHG | WEIGHT: 163 LBS | HEART RATE: 80 BPM | HEIGHT: 67 IN | SYSTOLIC BLOOD PRESSURE: 120 MMHG

## 2019-01-24 DIAGNOSIS — E55.9 VITAMIN D DEFICIENCY: ICD-10-CM

## 2019-01-24 DIAGNOSIS — E78.2 MIXED HYPERLIPIDEMIA: Primary | ICD-10-CM

## 2019-01-24 DIAGNOSIS — R13.14 PHARYNGOESOPHAGEAL DYSPHAGIA: ICD-10-CM

## 2019-01-24 DIAGNOSIS — Z12.11 ENCOUNTER FOR SCREENING COLONOSCOPY: ICD-10-CM

## 2019-01-24 DIAGNOSIS — Z12.11 SCREEN FOR COLON CANCER: ICD-10-CM

## 2019-01-24 PROBLEM — M54.9 UPPER BACK PAIN ON LEFT SIDE: Status: RESOLVED | Noted: 2018-07-24 | Resolved: 2019-01-24

## 2019-01-24 PROBLEM — N30.00 ACUTE CYSTITIS WITHOUT HEMATURIA: Status: RESOLVED | Noted: 2018-12-21 | Resolved: 2019-01-24

## 2019-01-24 LAB
25(OH)D3+25(OH)D2 SERPL-MCNC: 50.6 NG/ML
ALBUMIN SERPL-MCNC: 4.7 G/DL (ref 3.5–5)
ALBUMIN/GLOB SERPL: 1.8 G/DL (ref 1–2)
ALP SERPL-CCNC: 58 U/L (ref 38–126)
ALT SERPL-CCNC: 32 U/L (ref 13–69)
AST SERPL-CCNC: 18 U/L (ref 15–46)
BASOPHILS # BLD AUTO: 0.04 10*3/MM3 (ref 0–0.2)
BASOPHILS NFR BLD AUTO: 0.6 % (ref 0–2.5)
BILIRUB SERPL-MCNC: 1.1 MG/DL (ref 0.2–1.3)
BUN SERPL-MCNC: 15 MG/DL (ref 7–20)
BUN/CREAT SERPL: 16.7 (ref 7.1–23.5)
CALCIUM SERPL-MCNC: 10 MG/DL (ref 8.4–10.2)
CHLORIDE SERPL-SCNC: 103 MMOL/L (ref 98–107)
CHOLEST SERPL-MCNC: 185 MG/DL (ref 0–199)
CHOLEST/HDLC SERPL: 4.51 {RATIO}
CO2 SERPL-SCNC: 28 MMOL/L (ref 26–30)
CREAT SERPL-MCNC: 0.9 MG/DL (ref 0.6–1.3)
EOSINOPHIL # BLD AUTO: 0.09 10*3/MM3 (ref 0–0.7)
EOSINOPHIL NFR BLD AUTO: 1.4 % (ref 0–7)
ERYTHROCYTE [DISTWIDTH] IN BLOOD BY AUTOMATED COUNT: 12.1 % (ref 11.5–14.5)
GLOBULIN SER CALC-MCNC: 2.6 GM/DL
GLUCOSE SERPL-MCNC: 91 MG/DL (ref 74–98)
HCT VFR BLD AUTO: 41.7 % (ref 37–47)
HDLC SERPL-MCNC: 41 MG/DL (ref 40–60)
HGB BLD-MCNC: 14.2 G/DL (ref 12–16)
IMM GRANULOCYTES # BLD AUTO: 0.02 10*3/MM3 (ref 0–0.06)
IMM GRANULOCYTES NFR BLD AUTO: 0.3 % (ref 0–0.6)
LDLC SERPL CALC-MCNC: 109 MG/DL (ref 0–99)
LYMPHOCYTES # BLD AUTO: 2.29 10*3/MM3 (ref 0.6–3.4)
LYMPHOCYTES NFR BLD AUTO: 34.7 % (ref 10–50)
MCH RBC QN AUTO: 31.9 PG (ref 27–31)
MCHC RBC AUTO-ENTMCNC: 34.1 G/DL (ref 30–37)
MCV RBC AUTO: 93.7 FL (ref 81–99)
MONOCYTES # BLD AUTO: 0.45 10*3/MM3 (ref 0–0.9)
MONOCYTES NFR BLD AUTO: 6.8 % (ref 0–12)
NEUTROPHILS # BLD AUTO: 3.71 10*3/MM3 (ref 2–6.9)
NEUTROPHILS NFR BLD AUTO: 56.2 % (ref 37–80)
NRBC BLD AUTO-RTO: 0 /100 WBC (ref 0–0)
PLATELET # BLD AUTO: 266 10*3/MM3 (ref 130–400)
POTASSIUM SERPL-SCNC: 4.4 MMOL/L (ref 3.5–5.1)
PROT SERPL-MCNC: 7.3 G/DL (ref 6.3–8.2)
RBC # BLD AUTO: 4.45 10*6/MM3 (ref 4.2–5.4)
SODIUM SERPL-SCNC: 139 MMOL/L (ref 137–145)
TRIGL SERPL-MCNC: 177 MG/DL
VLDLC SERPL CALC-MCNC: 35.4 MG/DL
WBC # BLD AUTO: 6.6 10*3/MM3 (ref 4.8–10.8)

## 2019-01-24 PROCEDURE — 99214 OFFICE O/P EST MOD 30 MIN: CPT | Performed by: INTERNAL MEDICINE

## 2019-01-24 NOTE — PROGRESS NOTES
Chief Complaint   Patient presents with   • Follow-up     for HLD. Pt states she was waking up at night gasping for air and then she would kind of wheeze/gasp after coughing. Pt states once she started taking the omeprazole she hasn't woke up gasping for air but she is still having the coughing issues, not as bad but it is still happening.      Subjective   Tere Fajardo is a 53 y.o. female.     Patient here today for follow-up of hyperlipidemia, vitamin D deficiency and acne.  HLD- she has been trying to eat a better diet.  She is eating sardines and better foods for her HLD.  Has lost 10 lbs since first of the yr. She has not been exercising.   Acne-  She is taking aldactone once a day for her acne.  She discontinued Retin-A as she felt it was too strong for her.  She will return to dermatologist to discuss this  Vitamin D deficiency- last level was normal a yr ago.  She is not currently taking a vitamin D supplement but does take a multivitamin with D in it  Dysphagia- she was seen by GI and had an EGD.  She had a dilation and her sxs are much improved.  Says her throat has never felt normal since her dilation.   HCM- her mammogram is due. Her pap is due this yr with Dr Love. It is time for colonoscopy.   In Dec and earlier this month she had a few episodes of wheeze/gasp at night.  This occurred several nights in a row.  She would awake with difficultly breathing and gasping. She never had GERD sxs.  She raised her bed and started to take her omeprazole and her sxs have improved.   She continues to have a cough.  No burning in chest.  She does feel like there is something closing in her throat, and has occasional sensation of difficulty swallowing.  She was seen in November for URI symptoms.  She still has an intermittent cough since that time.         The following portions of the patient's history were reviewed and updated as appropriate: allergies, current medications, past family history, past medical  "history, past social history, past surgical history and problem list.    Review of Systems   Constitutional: Negative for chills and fever.   HENT: Positive for trouble swallowing. Negative for congestion, ear pain, postnasal drip, sore throat and voice change.    Respiratory: Positive for cough, shortness of breath and wheezing.    Cardiovascular: Negative for chest pain, palpitations and leg swelling.   Gastrointestinal: Negative for abdominal pain, nausea and vomiting.   Psychiatric/Behavioral: The patient is nervous/anxious.    All other systems reviewed and are negative.      Objective   /76   Pulse 80   Temp 97.6 °F (36.4 °C)   Ht 170.2 cm (67\")   Wt 73.9 kg (163 lb)   SpO2 98%   BMI 25.53 kg/m²   Body mass index is 25.53 kg/m².  Physical Exam   Constitutional: She is oriented to person, place, and time. She appears well-developed and well-nourished. No distress.   Pleasant female in no distress today   HENT:   Head: Normocephalic and atraumatic.   Right Ear: External ear normal.   Left Ear: External ear normal.   Mouth/Throat: Oropharynx is clear and moist.   Eyes: Conjunctivae and EOM are normal. Pupils are equal, round, and reactive to light.   Neck: Normal range of motion. Neck supple. No thyromegaly present.   Cardiovascular: Normal rate, regular rhythm, normal heart sounds and intact distal pulses.   No murmur heard.  No carotid bruits   Pulmonary/Chest: Effort normal and breath sounds normal. No stridor. No respiratory distress. She has no wheezes. She has no rales.   Abdominal: Soft. Bowel sounds are normal. She exhibits no distension. There is no tenderness.   Musculoskeletal: Normal range of motion. She exhibits no edema.   Lymphadenopathy:     She has no cervical adenopathy.   Neurological: She is alert and oriented to person, place, and time. No cranial nerve deficit. Coordination normal.   Skin:   No significant acne noted on exam   Psychiatric: She has a normal mood and affect. Her " behavior is normal. Judgment and thought content normal.   Nursing note and vitals reviewed.      Assessment/Plan   Tere Fajardo is here today and the following problems have been addressed:      Tere was seen today for follow-up.    Diagnoses and all orders for this visit:    Mixed hyperlipidemia  -     Comprehensive Metabolic Panel  -     Lipid Panel With / Chol / HDL Ratio    Pharyngoesophageal dysphagia  -     CBC & Differential    Encounter for screening colonoscopy  -     Ambulatory Referral to Gastroenterology    Vitamin D deficiency  -     Vitamin D 25 Hydroxy    Avoid eating spicy foods  Avoid caffeinated beverages  Avoid carbonated beverages  Do not eat or drink within 2-3 hours of going to bed in the evening  Elevate head of bed on 4-6 inch blocks  Eat smaller portions of food throughout the day  Take zantac 150 mg daily, if symptoms of reflux or difficulty swallowing persist, increase Zantac to twice daily  Refer to GI for colonoscopy.  If symptoms of reflux or difficulty swallowing persist, discuss this with gastroenterologist  She will schedule mammogram  She has had Hep A vaccine, she declines flu shot  She will discuss her retin A with derm    RTC one yr or prn    Please note that portions of this note were completed with a voice recognition program.  Efforts were made to edit dictation, but occasionally words are mistranscribed.

## 2019-02-26 ENCOUNTER — OFFICE VISIT (OUTPATIENT)
Dept: OBSTETRICS AND GYNECOLOGY | Facility: CLINIC | Age: 54
End: 2019-02-26

## 2019-02-26 VITALS
BODY MASS INDEX: 25.58 KG/M2 | HEIGHT: 67 IN | SYSTOLIC BLOOD PRESSURE: 122 MMHG | DIASTOLIC BLOOD PRESSURE: 72 MMHG | WEIGHT: 163 LBS

## 2019-02-26 DIAGNOSIS — Z12.39 ENCOUNTER FOR BREAST CANCER SCREENING OTHER THAN MAMMOGRAM: ICD-10-CM

## 2019-02-26 DIAGNOSIS — Z01.419 ENCOUNTER FOR GYNECOLOGICAL EXAMINATION (GENERAL) (ROUTINE) WITHOUT ABNORMAL FINDINGS: Primary | ICD-10-CM

## 2019-02-26 DIAGNOSIS — N81.11 CYSTOCELE, MIDLINE: ICD-10-CM

## 2019-02-26 DIAGNOSIS — R53.83 FATIGUE, UNSPECIFIED TYPE: ICD-10-CM

## 2019-02-26 DIAGNOSIS — R32 URINARY INCONTINENCE, UNSPECIFIED TYPE: ICD-10-CM

## 2019-02-26 PROCEDURE — 99213 OFFICE O/P EST LOW 20 MIN: CPT | Performed by: OBSTETRICS & GYNECOLOGY

## 2019-02-26 PROCEDURE — 99396 PREV VISIT EST AGE 40-64: CPT | Performed by: OBSTETRICS & GYNECOLOGY

## 2019-02-26 NOTE — PROGRESS NOTES
Subjective  Chief Complaint   Patient presents with   • Gynecologic Exam     Patient is 53 y.o.  here for her annual examination.  Pt doing well other than complaints of profound fatigue and trouble losing weight.  Pt has not had thyroid checked recently.  Pt see's Dr. Vermeesch for her primary care.  Pt also with complaints of urinary incontinence over the last year.  Pt with both stress incontinence and occ urgency and urge incontinence.  Pt does not have to wear a pad.  Pt also reports having occ trouble emptying her bladder.  Pt tries to do her kegel exercises.  Pt has also recently been exercising more including abdominal exercises.  Pt is not aware of any bulge per vagina.  Pt denies any problems with her bms; denies straining with bms or having to splint with defecation.    History  Past Medical History:   Diagnosis Date   • Hyperlipidemia      Current Outpatient Medications on File Prior to Visit   Medication Sig Dispense Refill   • omeprazole (priLOSEC) 20 MG capsule Take 20 mg by mouth Daily.     • spironolactone (ALDACTONE) 50 MG tablet Take 50 mg by mouth Daily.     • tretinoin (RETIN-A) 0.01 % gel Apply  topically Every Night.       No current facility-administered medications on file prior to visit.      No Known Allergies  Past Surgical History:   Procedure Laterality Date   • D&C CERVICAL BIOPSY     • ENDOSCOPY       Family History   Problem Relation Age of Onset   • Arthritis Mother    • Hiatal hernia Mother    • Heart attack Father      Social History     Socioeconomic History   • Marital status:      Spouse name: Not on file   • Number of children: Not on file   • Years of education: Not on file   • Highest education level: Not on file   Tobacco Use   • Smoking status: Former Smoker     Packs/day: 1.00     Years: 5.00     Pack years: 5.00     Types: Cigarettes     Last attempt to quit:      Years since quittin.1   • Smokeless tobacco: Never Used   Substance and Sexual  "Activity   • Alcohol use: Yes   • Drug use: No   • Sexual activity: Defer     Review of Systems  All systems were reviewed and negative except for:  Endocrine: positive for  hot flashes     Objective  Vitals:    02/26/19 1344   BP: 122/72   Weight: 73.9 kg (163 lb)   Height: 170.2 cm (67\")     Physical Exam:  General Appearance: alert, appears stated age and cooperative  Head: normocephalic, without obvious abnormality and atraumatic  Eyes: lids and lashes normal, conjunctivae and sclerae normal, no icterus, no pallor, corneas clear and PERRLA  Ears: ears appear intact with no abnormalities noted  Nose: nares normal, septum midline, mucosa normal and no drainage  Neck: suppple, trachea midline and no thyromegaly  Lungs: clear to auscultation, respirations regular, respirations even and respirations unlabored  Heart: regular rhythm and normal rate, normal S1, S2, no murmur, gallop, or rubs and no click  Breasts: Examined in supine position  Symmetric without masses or skin dimpling  Nipples normal without inversion, lesions or discharge  There are no palpable axillary nodes  Abdomen: normal bowel sounds, no masses, no hepatomegaly, no splenomegaly, soft non-tender, no guarding and no rebound tenderness  Pelvic: Clinical staff was present for exam  External genitalia:  normal appearance of the external genitalia including Bartholin's and [place]'s glands.  :  urethral meatus normal;  Vaginal:  normal pink mucosa without prolapse or lesions.  Cervix:  normal appearance.  Uterus:  normal size, shape and consistency.  Adnexa:  non palpable bilaterally.  Cystocele GRADE 1  Pap smear done and specimen sent using Thin-Prep technique  Extremities: moves extremities well, no edema, no cyanosis and no redness  Skin: no bleeding, bruising or rash and no lesions noted  Lymph Nodes: no palpable adenopathy  Neuro: CN II-X grossly intact; sensation intact  Psych: normal mood and affect, oriented to person, time and place, thought " content organized and appropriate judgment  Lab Review   No data reviewed    Imaging   Mammogram report    Assessment/Plan  Problem List Items Addressed This Visit     None      Visit Diagnoses     Encounter for gynecological examination (general) (routine) without abnormal findings    -  Primary  Pap was done today.  If she does not receive the results of the Pap within 2 weeks  time, she was instructed to call to find out the results.  I explained to Tere that the recommendations for Pap smear interval in a low risk patient has lengthened to 3 years time if cytology alone normal or  5 years time if both cytology and HPV testing were normal.  I encouraged her to be seen yearly for a full physical exam including breast and pelvic exam even during the off years when PAP's will not be performed.    Relevant Orders    Pap IG, Rfx HPV ASCU    Mammo Screening Digital Tomosynthesis Bilateral With CAD    Thyroid Panel With TSH (Completed)    Encounter for breast cancer screening other than mammogram      It is recommended per ACOG, for women at average risk to start annual mammogram screening at the age of 40 until the age of 75 and an individualized decision be made for women after age 75.  She was encouraged to continue getting yearly mammograms.  She should report any palpable breast lump(s) or skin changes regardless of mammographic findings.  I explained to Tere that notification regarding her mammogram results will come from the center performing the study.  Our office will not be routinely calling with mammogram results.  It is her responsibility to make sure that the results from the mammogram are communicated to her by the breast center.  If she has any questions about the results, she is welcome to call our office anytime.    Tere was counseled regarding having clinical breast exams and breast self-awareness.  Women aged 29-39 years of age should have clinical breast exams every 1-3 years and yearly aged 40  and older.  The patient was counseled regarding breast self-awareness focusing on having a sense of what is normal for her breasts so that she can tell if there are changes.  Even small changes should be reported to provider.    Fatigue, unspecified type    New  Will obtain following labs as noted above.  Pt instructed to call for results.    Cystocele, midline    New  Pt with pelvic organ prolapse.  Risks factors discussed including increasing parity, advancing age, obesity, history of hysterectomy, and chronic constipation.  Signs and symptoms discussed.  Treatment options discussed as well including expectant management, pelvic floor muscle exercises, pessary, medication, and surgery.  Changes in lifestyle discussed including no heavy lifting or straining, keep stools soft, and avoid increasing pressure in the area of prolapse.  Pelvic floor exercises were also discussed and reviewed.  Benefits and complications of wearing a pessary was discussed.  Estrogen therapy to strengthen the supporting structures and possibly ameliorate symptoms was discussed.  Surgical intervention with risks, complications, and benefits was also discussed.  Pt to consider options as discussed if worsening of symptoms.  Pt instructed to eliminate abdominal exercises.    Urinary incontinence, unspecified type    Stevie Fajardo was informed that urinary incontinence is the involuntary leaking of urine caused by a wide variety of factors.  It is a common condition in women that increases with age.  The patient was informed that there are different types of urinary incontinence to include stress urinary incontinence, urgency urinary incontinence, and mixed urinary incontinence as well as other subtypes.  The patient was informed that the correct diagnosis is important in the evaluation and treatment of her leaking.  The basic evaluation includes patient's history and physical examination.  A urinary tract infection needs to be ruled out  as well as urinary retention and overflow incontinence.  Sometimes additional specialized testing needs to be performed such as urodynamic testing and cystoscopy.  The various treatment options were discussed ranging from conservative  treatment to include pelvic floor exercises and modifications in lifestyle, pessary, pharmacologic, to surgical.  Locally administered estrogen may be of some benefit in women with vaginal atrophy. Because treatment options vary by incontinence type and effectiveness, it is important to determine the etiology and severity of symptoms.        Follow up as discussed/scheduled  This note was electronically signed.  Cherry Love M.D.

## 2019-02-27 LAB
FT4I SERPL CALC-MCNC: 1.6 (ref 1.2–4.9)
T3RU NFR SERPL: 23 % (ref 24–39)
T4 SERPL-MCNC: 7.1 UG/DL (ref 4.5–12)
TSH SERPL DL<=0.005 MIU/L-ACNC: 3.77 UIU/ML (ref 0.45–4.5)

## 2019-03-11 DIAGNOSIS — Z01.419 ENCOUNTER FOR GYNECOLOGICAL EXAMINATION (GENERAL) (ROUTINE) WITHOUT ABNORMAL FINDINGS: ICD-10-CM

## 2019-03-22 DIAGNOSIS — Z12.11 SCREENING FOR COLON CANCER: Primary | ICD-10-CM

## 2019-03-22 RX ORDER — SODIUM, POTASSIUM,MAG SULFATES 17.5-3.13G
SOLUTION, RECONSTITUTED, ORAL ORAL
Qty: 2 BOTTLE | Refills: 0 | Status: SHIPPED | OUTPATIENT
Start: 2019-03-22 | End: 2020-01-24

## 2019-03-29 ENCOUNTER — LAB REQUISITION (OUTPATIENT)
Dept: LAB | Facility: HOSPITAL | Age: 54
End: 2019-03-29

## 2019-03-29 ENCOUNTER — OUTSIDE FACILITY SERVICE (OUTPATIENT)
Dept: GASTROENTEROLOGY | Facility: CLINIC | Age: 54
End: 2019-03-29

## 2019-03-29 DIAGNOSIS — Z12.11 ENCOUNTER FOR SCREENING FOR MALIGNANT NEOPLASM OF COLON: ICD-10-CM

## 2019-03-29 DIAGNOSIS — D12.2 BENIGN NEOPLASM OF ASCENDING COLON: ICD-10-CM

## 2019-03-29 DIAGNOSIS — K57.30 DIVERTICULOSIS OF LARGE INTESTINE WITHOUT PERFORATION OR ABSCESS WITHOUT BLEEDING: ICD-10-CM

## 2019-03-29 DIAGNOSIS — K64.8 OTHER HEMORRHOIDS: ICD-10-CM

## 2019-03-29 DIAGNOSIS — D12.8 BENIGN NEOPLASM OF RECTUM: ICD-10-CM

## 2019-03-29 DIAGNOSIS — K62.1 RECTAL POLYP: ICD-10-CM

## 2019-03-29 PROCEDURE — 45385 COLONOSCOPY W/LESION REMOVAL: CPT | Performed by: INTERNAL MEDICINE

## 2019-03-29 PROCEDURE — 88305 TISSUE EXAM BY PATHOLOGIST: CPT | Performed by: INTERNAL MEDICINE

## 2019-04-01 LAB
CYTO UR: NORMAL
LAB AP CASE REPORT: NORMAL
LAB AP CLINICAL INFORMATION: NORMAL
PATH REPORT.FINAL DX SPEC: NORMAL
PATH REPORT.GROSS SPEC: NORMAL

## 2019-04-02 ENCOUNTER — TELEPHONE (OUTPATIENT)
Dept: GASTROENTEROLOGY | Facility: CLINIC | Age: 54
End: 2019-04-02

## 2019-04-02 NOTE — TELEPHONE ENCOUNTER
I called and spoke with Mrs. Fajardo regarding the pathology.  She did have 3 adenoma-type polyps.  The patient will need a repeat examination in 3 years.

## 2019-04-16 ENCOUNTER — APPOINTMENT (OUTPATIENT)
Dept: MAMMOGRAPHY | Facility: HOSPITAL | Age: 54
End: 2019-04-16

## 2019-05-17 ENCOUNTER — APPOINTMENT (OUTPATIENT)
Dept: MAMMOGRAPHY | Facility: HOSPITAL | Age: 54
End: 2019-05-17

## 2019-06-17 ENCOUNTER — OFFICE VISIT (OUTPATIENT)
Dept: INTERNAL MEDICINE | Facility: CLINIC | Age: 54
End: 2019-06-17

## 2019-06-17 VITALS
OXYGEN SATURATION: 97 % | HEART RATE: 72 BPM | DIASTOLIC BLOOD PRESSURE: 76 MMHG | TEMPERATURE: 97.5 F | SYSTOLIC BLOOD PRESSURE: 124 MMHG

## 2019-06-17 DIAGNOSIS — G44.209 ACUTE NON INTRACTABLE TENSION-TYPE HEADACHE: Primary | ICD-10-CM

## 2019-06-17 PROBLEM — G44.52 NEW DAILY PERSISTENT HEADACHE: Status: RESOLVED | Noted: 2019-06-17 | Resolved: 2019-06-17

## 2019-06-17 PROBLEM — G44.52 NEW DAILY PERSISTENT HEADACHE: Status: ACTIVE | Noted: 2019-06-17

## 2019-06-17 LAB
EXPIRATION DATE: NORMAL
INTERNAL CONTROL: NORMAL
Lab: NORMAL
S PYO RRNA THROAT QL PROBE: NEGATIVE

## 2019-06-17 PROCEDURE — 96372 THER/PROPH/DIAG INJ SC/IM: CPT | Performed by: INTERNAL MEDICINE

## 2019-06-17 PROCEDURE — 87651 STREP A DNA AMP PROBE: CPT | Performed by: INTERNAL MEDICINE

## 2019-06-17 PROCEDURE — 99214 OFFICE O/P EST MOD 30 MIN: CPT | Performed by: INTERNAL MEDICINE

## 2019-06-17 RX ORDER — KETOROLAC TROMETHAMINE 30 MG/ML
60 INJECTION, SOLUTION INTRAMUSCULAR; INTRAVENOUS ONCE
Status: COMPLETED | OUTPATIENT
Start: 2019-06-17 | End: 2019-06-17

## 2019-06-17 RX ADMIN — KETOROLAC TROMETHAMINE 60 MG: 30 INJECTION, SOLUTION INTRAMUSCULAR; INTRAVENOUS at 12:56

## 2019-06-17 NOTE — PROGRESS NOTES
Chief Complaint   Patient presents with   • Abstract     Pt states she's had headaches since Saturday, some body aches as well.      Subjective   Tere Fajardo is a 54 y.o. female.     Pt here today with complaints of HA and body aches for past 4 days.   She had her Hep A #2 vaccine on Thursday and on Friday she started to get HA.    The HA intensity goes up and down.  She has no nausea, but has light sensitivity.    HA is dull and is band like around her head.    She has decreased appetite.  Also is fatigued.  No EA, ST or cough. No congestion.    She was exposed to strep about a week ago.    No fevers or chills.    She has taken  mg and tylenol 1000 mg but this did not help her HA.    HA does awaken her from sleep.    Her son and daughter do have migraines.    No recent tic bite she is aware of.           The following portions of the patient's history were reviewed and updated as appropriate: allergies, current medications, past family history, past medical history, past social history, past surgical history and problem list.    Review of Systems   Constitutional: Positive for fatigue.   HENT: Negative for congestion, sinus pressure and sore throat.    Eyes: Positive for photophobia.   Gastrointestinal: Negative for nausea and vomiting.   Musculoskeletal: Positive for myalgias. Negative for gait problem.   Neurological: Positive for headaches. Negative for dizziness, speech difficulty, weakness and numbness.   All other systems reviewed and are negative.      Objective   /76   Pulse 72   Temp 97.5 °F (36.4 °C)   SpO2 97%   There is no height or weight on file to calculate BMI.  Physical Exam   Constitutional: She is oriented to person, place, and time. She appears well-developed and well-nourished.   Very pleasant female in no obvious distress except mild photophobia   HENT:   Head: Normocephalic and atraumatic.   Right Ear: External ear normal.   Left Ear: External ear normal.   Mouth/Throat:  Oropharynx is clear and moist. No oropharyngeal exudate.   Eyes: Conjunctivae and EOM are normal. Pupils are equal, round, and reactive to light. Right eye exhibits no discharge.   Neck: Normal range of motion. Neck supple.   Neck with full range of motion and no pain with movement of neck   Cardiovascular: Normal rate, regular rhythm and normal heart sounds.   No murmur heard.  Pulmonary/Chest: Effort normal and breath sounds normal.   Lymphadenopathy:     She has cervical adenopathy.   Neurological: She is alert and oriented to person, place, and time. She displays normal reflexes. No cranial nerve deficit or sensory deficit. She exhibits normal muscle tone. Coordination normal.   Psychiatric: She has a normal mood and affect. Her behavior is normal. Judgment and thought content normal.   Nursing note and vitals reviewed.      Assessment/Plan   Tere Fajardo is here today and the following problems have been addressed:      Tere was seen today for abstract.    Diagnoses and all orders for this visit:    Acute non intractable tension-type headache    RSS is negative  Patient given Toradol 60 mg IM in clinic today  Her neurological exam is completely normal  She recently had a hepatitis A vaccine #2 and developed headache the following day, this could be side effect of immunization  Patient was told to go home and rest, apply heat to the back of her neck as she is complaining of some mild neck pain  Patient was encouraged to call me in the next 1 to 2 days for update on how she is feeling  If headache worsens patient was encouraged to return to clinic for further evaluation and possible order for MRI    Return to clinic as needed    Please note that portions of this note were completed with a voice recognition program.  Efforts were made to edit dictation, but occasionally words are mistranscribed.

## 2019-07-10 ENCOUNTER — HOSPITAL ENCOUNTER (OUTPATIENT)
Dept: MAMMOGRAPHY | Facility: HOSPITAL | Age: 54
Discharge: HOME | End: 2019-07-10
Admitting: OBSTETRICS & GYNECOLOGY

## 2019-07-10 DIAGNOSIS — Z01.419 ENCOUNTER FOR GYNECOLOGICAL EXAMINATION (GENERAL) (ROUTINE) WITHOUT ABNORMAL FINDINGS: ICD-10-CM

## 2019-07-10 PROCEDURE — 77067 SCR MAMMO BI INCL CAD: CPT

## 2019-07-10 PROCEDURE — 77063 BREAST TOMOSYNTHESIS BI: CPT

## 2019-07-24 ENCOUNTER — OFFICE VISIT (OUTPATIENT)
Dept: INTERNAL MEDICINE | Facility: CLINIC | Age: 54
End: 2019-07-24

## 2019-07-24 ENCOUNTER — HOSPITAL ENCOUNTER (OUTPATIENT)
Dept: GENERAL RADIOLOGY | Facility: HOSPITAL | Age: 54
Discharge: HOME | End: 2019-07-24
Admitting: INTERNAL MEDICINE

## 2019-07-24 VITALS
SYSTOLIC BLOOD PRESSURE: 120 MMHG | TEMPERATURE: 98.6 F | OXYGEN SATURATION: 98 % | DIASTOLIC BLOOD PRESSURE: 68 MMHG | HEART RATE: 82 BPM

## 2019-07-24 DIAGNOSIS — M79.671 RIGHT FOOT PAIN: Primary | ICD-10-CM

## 2019-07-24 DIAGNOSIS — M79.671 RIGHT FOOT PAIN: ICD-10-CM

## 2019-07-24 DIAGNOSIS — L70.8 OTHER ACNE: ICD-10-CM

## 2019-07-24 PROCEDURE — 73630 X-RAY EXAM OF FOOT: CPT

## 2019-07-24 PROCEDURE — 99214 OFFICE O/P EST MOD 30 MIN: CPT | Performed by: INTERNAL MEDICINE

## 2019-07-24 NOTE — PROGRESS NOTES
Chief Complaint   Patient presents with   • Abstract     Pt states she fell in her backyard yesterday and her right foot has been hurting since then. Pt states she needs a new referral for derm to [facility].      Subjective   Tere Fajardo is a 54 y.o. female.     Here today with complaints of right foot pain after a fall in her backyard yesterday.   She was walking in her clogs on pavers yesterday and rolled her right foot. She has pain over lateral edge and 3,4,5th toes of her right foot.   She has elevated her foot, taken IBU and taken a pain pill.  Has not applied ice, but she did apply some heat to area.    If she walks on inside of foot it is not as painful.     She needs a referral to derm for her adult acne.           The following portions of the patient's history were reviewed and updated as appropriate: allergies, current medications, past family history, past medical history, past social history, past surgical history and problem list.    Review of Systems   Constitutional: Positive for activity change.   Musculoskeletal: Positive for arthralgias and gait problem. Negative for joint swelling.   Skin:        Chronic adult acne       Objective   /68   Pulse 82   Temp 98.6 °F (37 °C)   SpO2 98%   There is no height or weight on file to calculate BMI.  Physical Exam   Constitutional: She is oriented to person, place, and time. She appears well-developed and well-nourished. No distress.   HENT:   Head: Normocephalic and atraumatic.   Eyes: EOM are normal. Pupils are equal, round, and reactive to light.   Pulmonary/Chest: Effort normal.   Musculoskeletal:   Right foot: Pain with palpation of lateral aspect of foot, no inflammation/redness/warmth, pain with vibration over medial fifth MTP   Neurological: She is alert and oriented to person, place, and time. No cranial nerve deficit. Coordination abnormal.   Psychiatric: She has a normal mood and affect. Her behavior is normal. Judgment and thought content  normal.   Nursing note and vitals reviewed.      Assessment/Plan   Tere Fajardo is here today and the following problems have been addressed:      Tere was seen today for abstract.    Diagnoses and all orders for this visit:    Right foot pain  -     XR Foot 3+ View Right; Future    Other acne  -     Ambulatory Referral to Dermatology    X-ray right foot today  Elevate, ice area 2-3 times a day for 20 minutes, take ibuprofen as directed and avoid excessive walking  Patient was wearing flip-flops today, recommend she wear tennis shoe when ambulating for better support  We will contact patient with x-ray results  Referral given for dermatology due to underlying chronic acne    Return to clinic as needed if symptoms worsen or persist    Please note that portions of this note were completed with a voice recognition program.  Efforts were made to edit dictation, but occasionally words are mistranscribed.

## 2019-07-25 NOTE — PROGRESS NOTES
Please tell patient there is no evidence of fracture on x-ray.  I would like her to wear a tight fitting tennis shoe, elevate leg and take ibuprofen 600 mg twice daily until pain resolves.

## 2020-01-24 ENCOUNTER — OFFICE VISIT (OUTPATIENT)
Dept: INTERNAL MEDICINE | Facility: CLINIC | Age: 55
End: 2020-01-24

## 2020-01-24 VITALS
SYSTOLIC BLOOD PRESSURE: 122 MMHG | HEART RATE: 78 BPM | TEMPERATURE: 98.4 F | BODY MASS INDEX: 26.53 KG/M2 | WEIGHT: 169 LBS | DIASTOLIC BLOOD PRESSURE: 62 MMHG | HEIGHT: 67 IN | OXYGEN SATURATION: 98 %

## 2020-01-24 DIAGNOSIS — E78.2 MIXED HYPERLIPIDEMIA: Primary | ICD-10-CM

## 2020-01-24 DIAGNOSIS — L70.8 OTHER ACNE: ICD-10-CM

## 2020-01-24 DIAGNOSIS — R13.14 PHARYNGOESOPHAGEAL DYSPHAGIA: ICD-10-CM

## 2020-01-24 PROBLEM — G44.209 TENSION TYPE HEADACHE: Status: RESOLVED | Noted: 2019-06-17 | Resolved: 2020-01-24

## 2020-01-24 PROBLEM — M79.671 RIGHT FOOT PAIN: Status: RESOLVED | Noted: 2019-07-24 | Resolved: 2020-01-24

## 2020-01-24 PROCEDURE — 99214 OFFICE O/P EST MOD 30 MIN: CPT | Performed by: INTERNAL MEDICINE

## 2020-01-24 NOTE — PROGRESS NOTES
Chief Complaint   Patient presents with   • Follow-up     1 year for HLD. Pt states she's been doing PT     Subjective   Tere MCGRATH is a 54 y.o. female.     Patient here today for follow-up of hyperlipidemia, vitamin D deficiency and acne.  HLD- she has not been eating a heart healthy diet.  She has not been exercising.  She takes fish oil on occasion.    Acne-  She is taking aldactone once a day for her acne.  Her acne has been well controlled overall  Vitamin D deficiency- last level was normal a yr ago.  She is not currently taking a vitamin D supplement but does take a multivitamin with D in it  Dysphagia- she had EGD with dilation in the past.  Is currently on pepcid for GERD and it is helpful, no sxs usually  HCM- her mammogram and colonoscopy are UTD.  She has had Hep A vaccines.  She declines flu vaccine.   She is currently doing some PT on her neck due to spasm, she is getting some dry needling also.  Her neck is improving.   When she shaves in  area she gets a rash.  She discussed with derm and GYN.  Was told to use benzoyl peroxide and KATINA.         The following portions of the patient's history were reviewed and updated as appropriate: allergies, current medications, past family history, past medical history, past social history, past surgical history and problem list.    Review of Systems   Constitutional: Negative for activity change, appetite change and unexpected weight change.   Eyes: Negative for visual disturbance.   Respiratory: Negative for shortness of breath.    Cardiovascular: Negative for chest pain, palpitations and leg swelling.   Gastrointestinal: Negative for abdominal pain.   Musculoskeletal: Positive for neck stiffness.   Skin: Positive for rash.   Neurological: Negative for headaches.   Psychiatric/Behavioral: Negative for dysphoric mood and sleep disturbance. The patient is not nervous/anxious.        Objective   /62   Pulse 78   Temp 98.4 °F (36.9 °C)   Ht 170.2 cm  "(67\")   Wt 76.7 kg (169 lb)   SpO2 98%   BMI 26.47 kg/m²   Body mass index is 26.47 kg/m².  Physical Exam   Constitutional: She is oriented to person, place, and time. She appears well-developed and well-nourished. No distress.   Very pleasant female who appears her stated age, she is in no distress today   HENT:   Head: Normocephalic and atraumatic.   Right Ear: External ear normal.   Left Ear: External ear normal.   Mouth/Throat: Oropharynx is clear and moist.   Eyes: Pupils are equal, round, and reactive to light. Conjunctivae and EOM are normal.   Neck: Normal range of motion. Neck supple. No thyromegaly present.   Cardiovascular: Normal rate, regular rhythm, normal heart sounds and intact distal pulses.   No murmur heard.  Pulmonary/Chest: Effort normal and breath sounds normal. No respiratory distress. She has no wheezes.   Abdominal: Soft. Bowel sounds are normal. She exhibits no distension. There is no tenderness.   Musculoskeletal: Normal range of motion. She exhibits no edema.   Lymphadenopathy:     She has no cervical adenopathy.   Neurological: She is alert and oriented to person, place, and time. No cranial nerve deficit. Coordination normal.   Skin:   Groin area is shaved and has some tiny white papular lesions, consistent with skin follicles that are inflamed   Psychiatric: She has a normal mood and affect. Her behavior is normal. Judgment and thought content normal.   Nursing note and vitals reviewed.      Assessment/Plan   Tere MCGRATH is here today and the following problems have been addressed:      Tere was seen today for follow-up.    Diagnoses and all orders for this visit:    Mixed hyperlipidemia  -     Comprehensive Metabolic Panel  -     Lipid Panel With / Chol / HDL Ratio    Other acne  -     CBC & Differential  -     Comprehensive Metabolic Panel    Pharyngoesophageal dysphagia    Follow heart healthy/low cholesterol diet  Avoid processed/fried foods/fast food  Exercise as tolerated " up to 30 minutes 5 days a week  Take all medications as prescribed  Labs as noted  Recommend KATINA/antibiotic to groin area after every shave  Continue aldactone for acne, she sees derm next month  Continue Pepcid for GERD and dysphasia as needed  She sees gynecology for Pap and breast exams    RTC one yr for annual exam    Please note that portions of this note were completed with a voice recognition program.  Efforts were made to edit dictation, but occasionally words are mistranscribed.

## 2020-01-25 LAB
ALBUMIN SERPL-MCNC: 4.7 G/DL (ref 3.5–5.2)
ALBUMIN/GLOB SERPL: 2.2 G/DL
ALP SERPL-CCNC: 58 U/L (ref 39–117)
ALT SERPL-CCNC: 32 U/L (ref 1–33)
AST SERPL-CCNC: 15 U/L (ref 1–32)
BASOPHILS # BLD AUTO: 0.05 10*3/MM3 (ref 0–0.2)
BASOPHILS NFR BLD AUTO: 0.8 % (ref 0–1.5)
BILIRUB SERPL-MCNC: 0.6 MG/DL (ref 0.2–1.2)
BUN SERPL-MCNC: 13 MG/DL (ref 6–20)
BUN/CREAT SERPL: 12.9 (ref 7–25)
CALCIUM SERPL-MCNC: 10 MG/DL (ref 8.6–10.5)
CHLORIDE SERPL-SCNC: 103 MMOL/L (ref 98–107)
CHOLEST SERPL-MCNC: 184 MG/DL (ref 0–200)
CHOLEST/HDLC SERPL: 4.49 {RATIO}
CO2 SERPL-SCNC: 26.3 MMOL/L (ref 22–29)
CREAT SERPL-MCNC: 1.01 MG/DL (ref 0.57–1)
EOSINOPHIL # BLD AUTO: 0.09 10*3/MM3 (ref 0–0.4)
EOSINOPHIL NFR BLD AUTO: 1.4 % (ref 0.3–6.2)
ERYTHROCYTE [DISTWIDTH] IN BLOOD BY AUTOMATED COUNT: 12.3 % (ref 12.3–15.4)
GLOBULIN SER CALC-MCNC: 2.1 GM/DL
GLUCOSE SERPL-MCNC: 94 MG/DL (ref 65–99)
HCT VFR BLD AUTO: 40.9 % (ref 34–46.6)
HDLC SERPL-MCNC: 41 MG/DL (ref 40–60)
HGB BLD-MCNC: 13.9 G/DL (ref 12–15.9)
IMM GRANULOCYTES # BLD AUTO: 0.01 10*3/MM3 (ref 0–0.05)
IMM GRANULOCYTES NFR BLD AUTO: 0.2 % (ref 0–0.5)
LDLC SERPL CALC-MCNC: 111 MG/DL (ref 0–100)
LYMPHOCYTES # BLD AUTO: 2.48 10*3/MM3 (ref 0.7–3.1)
LYMPHOCYTES NFR BLD AUTO: 38.6 % (ref 19.6–45.3)
MCH RBC QN AUTO: 31.4 PG (ref 26.6–33)
MCHC RBC AUTO-ENTMCNC: 34 G/DL (ref 31.5–35.7)
MCV RBC AUTO: 92.5 FL (ref 79–97)
MONOCYTES # BLD AUTO: 0.44 10*3/MM3 (ref 0.1–0.9)
MONOCYTES NFR BLD AUTO: 6.8 % (ref 5–12)
NEUTROPHILS # BLD AUTO: 3.36 10*3/MM3 (ref 1.7–7)
NEUTROPHILS NFR BLD AUTO: 52.2 % (ref 42.7–76)
NRBC BLD AUTO-RTO: 0 /100 WBC (ref 0–0.2)
PLATELET # BLD AUTO: 309 10*3/MM3 (ref 140–450)
POTASSIUM SERPL-SCNC: 4.5 MMOL/L (ref 3.5–5.2)
PROT SERPL-MCNC: 6.8 G/DL (ref 6–8.5)
RBC # BLD AUTO: 4.42 10*6/MM3 (ref 3.77–5.28)
SODIUM SERPL-SCNC: 141 MMOL/L (ref 136–145)
TRIGL SERPL-MCNC: 159 MG/DL (ref 0–150)
VLDLC SERPL CALC-MCNC: 31.8 MG/DL
WBC # BLD AUTO: 6.43 10*3/MM3 (ref 3.4–10.8)

## 2020-03-02 ENCOUNTER — OFFICE VISIT (OUTPATIENT)
Dept: INTERNAL MEDICINE | Facility: CLINIC | Age: 55
End: 2020-03-02

## 2020-03-02 VITALS
SYSTOLIC BLOOD PRESSURE: 122 MMHG | OXYGEN SATURATION: 97 % | HEART RATE: 96 BPM | TEMPERATURE: 98.4 F | DIASTOLIC BLOOD PRESSURE: 70 MMHG

## 2020-03-02 DIAGNOSIS — Z91.09 ENVIRONMENTAL ALLERGIES: Primary | ICD-10-CM

## 2020-03-02 PROCEDURE — 99213 OFFICE O/P EST LOW 20 MIN: CPT | Performed by: INTERNAL MEDICINE

## 2020-03-02 NOTE — PROGRESS NOTES
Chief Complaint   Patient presents with   • Abstract     Pt states she's been congested, diarrhea, bodyaches, nauseous, and headache.     Subjective   Tere MCGRATH is a 54 y.o. female.     She had NVD a few days ago and this is getting better.  She has more pressure in face now and watery eyes.      URI    This is a new problem. The current episode started in the past 7 days. The problem has been waxing and waning. There has been no fever. Associated symptoms include congestion, diarrhea, headaches and nausea. Pertinent negatives include no ear pain or sore throat. Associated symptoms comments: diarrhea. She has tried acetaminophen for the symptoms.        The following portions of the patient's history were reviewed and updated as appropriate: allergies, current medications, past family history, past medical history, past social history, past surgical history and problem list.    Review of Systems   HENT: Positive for congestion. Negative for ear pain and sore throat.    Gastrointestinal: Positive for diarrhea and nausea.   Neurological: Positive for headaches.       Objective   /70   Pulse 96   Temp 98.4 °F (36.9 °C)   SpO2 97%   There is no height or weight on file to calculate BMI.  Physical Exam   Constitutional: She is oriented to person, place, and time. She appears well-developed and well-nourished. No distress.   HENT:   Head: Normocephalic and atraumatic.   Right Ear: External ear normal.   Left Ear: External ear normal.   TMs dull bilaterally, nasal mucosa is dry, OP clear, no sinus pain   Eyes: Pupils are equal, round, and reactive to light. Conjunctivae and EOM are normal. Right eye exhibits no discharge. Left eye exhibits no discharge.   Neck: Normal range of motion. Neck supple.   Cardiovascular: Normal rate, regular rhythm and normal heart sounds.   No murmur heard.  Pulmonary/Chest: Effort normal and breath sounds normal. No respiratory distress. She has no wheezes.   Musculoskeletal: She  exhibits no edema.   Lymphadenopathy:     She has no cervical adenopathy.   Neurological: She is alert and oriented to person, place, and time. No cranial nerve deficit. Coordination normal.   Psychiatric: She has a normal mood and affect. Her behavior is normal. Judgment and thought content normal.   Nursing note and vitals reviewed.      Assessment/Plan   Tere MCGRATH is here today and the following problems have been addressed:      Tere was seen today for abstract.    Diagnoses and all orders for this visit:    Environmental allergies    Flu is negative  Recommend nasacort AQ 2 sprays each nare daily  Increase fluids   Avoid antihistamines now due to nasal dryness    RTC prn if sxs worsen or persist    Please note that portions of this note were completed with a voice recognition program.  Efforts were made to edit dictation, but occasionally words are mistranscribed.

## 2020-06-10 ENCOUNTER — OFFICE VISIT (OUTPATIENT)
Dept: OBSTETRICS AND GYNECOLOGY | Facility: CLINIC | Age: 55
End: 2020-06-10

## 2020-06-10 VITALS
SYSTOLIC BLOOD PRESSURE: 120 MMHG | DIASTOLIC BLOOD PRESSURE: 70 MMHG | HEIGHT: 67 IN | WEIGHT: 170 LBS | BODY MASS INDEX: 26.68 KG/M2

## 2020-06-10 DIAGNOSIS — Z01.419 ENCOUNTER FOR GYNECOLOGICAL EXAMINATION (GENERAL) (ROUTINE) WITHOUT ABNORMAL FINDINGS: Primary | ICD-10-CM

## 2020-06-10 DIAGNOSIS — Z12.31 ENCOUNTER FOR SCREENING MAMMOGRAM FOR BREAST CANCER: ICD-10-CM

## 2020-06-10 PROCEDURE — 99396 PREV VISIT EST AGE 40-64: CPT | Performed by: OBSTETRICS & GYNECOLOGY

## 2020-06-10 NOTE — PROGRESS NOTES
Subjective  Chief Complaint   Patient presents with   • Gynecologic Exam     Patient is 55 y.o.  here for her annual examination.  Patient had her last Pap smear in 2019.  Patient had her mammogram in 2019.  Patient also had colonoscopy last year in 2019 with Dr. Lobato.  Patient reports she is to repeat in 3 to 5 years.  Patient continues to see Dr. Vermeesch for primary care.  Patient reports no changes in her health or medications.  Patient denies any family history of GYN malignancy.  Patient would like however to be referred to [facility] ovarian cancer screening.    History  Past Medical History:   Diagnosis Date   • Hyperlipidemia      Current Outpatient Medications on File Prior to Visit   Medication Sig Dispense Refill   • Famotidine (PEPCID AC PO) Take  by mouth.     • spironolactone (ALDACTONE) 50 MG tablet Take 50 mg by mouth Daily.     • tretinoin (RETIN-A) 0.01 % gel Apply  topically Every Night.       No current facility-administered medications on file prior to visit.      No Known Allergies  Past Surgical History:   Procedure Laterality Date   • D&C CERVICAL BIOPSY     • ENDOSCOPY       Family History   Problem Relation Age of Onset   • Arthritis Mother    • Hiatal hernia Mother    • Heart attack Father      Social History     Socioeconomic History   • Marital status:      Spouse name: Not on file   • Number of children: Not on file   • Years of education: Not on file   • Highest education level: Not on file   Tobacco Use   • Smoking status: Former Smoker     Packs/day: 1.00     Years: 5.00     Pack years: 5.00     Types: Cigarettes     Last attempt to quit:      Years since quittin.4   • Smokeless tobacco: Never Used   Substance and Sexual Activity   • Alcohol use: Yes   • Drug use: No   • Sexual activity: Defer     Review of Systems  The following systems were reviewed and negative:  constitution, eyes, ENT, respiratory, cardiovascular, gastrointestinal,  "genitourinary, integument, breast, hematologic / lymphatic, musculoskeletal, neurological, behavioral/psych, endocrine and allergies / immunologic     Objective  Vitals:    06/10/20 1338   BP: 120/70   Weight: 77.1 kg (170 lb)   Height: 170.2 cm (67\")     Physical Exam:  General Appearance: alert, appears stated age and cooperative  Head: normocephalic, without obvious abnormality and atraumatic  Eyes: lids and lashes normal, conjunctivae and sclerae normal, no icterus, no pallor, corneas clear and PERRLA  Ears: ears appear intact with no abnormalities noted  Nose: nares normal, septum midline, mucosa normal and no drainage  Neck: suppple, trachea midline and no thyromegaly  Lungs: clear to auscultation, respirations regular, respirations even and respirations unlabored  Heart: regular rhythm and normal rate, normal S1, S2, no murmur, gallop, or rubs and no click  Breasts: Examined in supine position  Symmetric without masses or skin dimpling  Nipples normal without inversion, lesions or discharge  There are no palpable axillary nodes  Abdomen: normal bowel sounds, no masses, no hepatomegaly, no splenomegaly, soft non-tender, no guarding and no rebound tenderness  Pelvic: Clinical staff was present for exam  External genitalia:  normal appearance of the external genitalia including Bartholin's and [place]'s glands.  :  urethral meatus normal;  Vaginal:  normal pink mucosa without prolapse or lesions.  Cervix:  normal appearance.  Uterus:  normal size, shape and consistency.  Adnexa:  non palpable bilaterally.  Pap smear done and specimen sent using Thin-Prep technique  Extremities: moves extremities well, no edema, no cyanosis and no redness  Skin: no bleeding, bruising or rash and no lesions noted  Lymph Nodes: no palpable adenopathy  Neuro: CN II-X grossly intact; sensation intact  Psych: normal mood and affect, oriented to person, time and place, thought content organized and appropriate judgment  Lab Review   No " data reviewed    Imaging   Mammogram report    Decision to Obtain Medical Records  No    Summary of Medical Records  No    Assessment/Plan  Problem List Items Addressed This Visit     None      Visit Diagnoses     Encounter for gynecological examination (general) (routine) without abnormal findings    -  Primary  Pap was done today.  If she does not receive the results of the Pap within 2 weeks  time, she was instructed to call to find out the results.  I explained to Tere that the recommendations for Pap smear interval in a low risk patient has lengthened to 3 years time if cytology alone normal or  5 years time if both cytology and HPV testing were normal.  I encouraged her to be seen yearly for a full physical exam including breast and pelvic exam even during the off years when PAP's will not be performed.  Refer to [facility] Screening.    Relevant Orders    Pap IG, Rfx HPV ASCU    Encounter for screening mammogram for breast cancer      It is recommended per ACOG, for women at average risk to start annual mammogram screening at the age of 40 until the age of 75 and an individualized decision be made for women after age 75.  She was encouraged to continue getting yearly mammograms.  She should report any palpable breast lump(s) or skin changes regardless of mammographic findings.  I explained to Tere that notification regarding her mammogram results will come from the center performing the study.  Our office will not be routinely calling with mammogram results.  It is her responsibility to make sure that the results from the mammogram are communicated to her by the breast center.  If she has any questions about the results, she is welcome to call our office anytime.  Pt to schedule.    Tere was counseled regarding having clinical breast exams and breast self-awareness.  Women aged 29-39 years of age should have clinical breast exams every 1-3 years and yearly aged 40 and older.  The patient was counseled  regarding breast self-awareness focusing on having a sense of what is normal for her breasts so that she can tell if there are changes.  Even small changes should be reported to provider.    Relevant Orders    Mammo Screening Digital Tomosynthesis Bilateral With CAD        Follow up as discussed/scheduled  Note: Speech recognition transcription software may have been used to dictate portions of this document.  An attempt at proofreading has been made though minor errors in transcription may still be present.  This note was electronically signed.  Cherry Love M.D.

## 2020-06-22 DIAGNOSIS — Z01.419 ENCOUNTER FOR GYNECOLOGICAL EXAMINATION (GENERAL) (ROUTINE) WITHOUT ABNORMAL FINDINGS: ICD-10-CM

## 2020-07-21 ENCOUNTER — HOSPITAL ENCOUNTER (OUTPATIENT)
Dept: MAMMOGRAPHY | Facility: HOSPITAL | Age: 55
Discharge: HOME | End: 2020-07-21
Admitting: OBSTETRICS & GYNECOLOGY

## 2020-07-21 DIAGNOSIS — Z12.31 ENCOUNTER FOR SCREENING MAMMOGRAM FOR BREAST CANCER: ICD-10-CM

## 2020-07-21 PROCEDURE — 77063 BREAST TOMOSYNTHESIS BI: CPT

## 2020-07-21 PROCEDURE — 77067 SCR MAMMO BI INCL CAD: CPT

## 2020-09-16 ENCOUNTER — OFFICE VISIT (OUTPATIENT)
Dept: INTERNAL MEDICINE | Facility: CLINIC | Age: 55
End: 2020-09-16

## 2020-09-16 VITALS
SYSTOLIC BLOOD PRESSURE: 122 MMHG | TEMPERATURE: 97.7 F | HEIGHT: 67 IN | DIASTOLIC BLOOD PRESSURE: 70 MMHG | BODY MASS INDEX: 26.21 KG/M2 | WEIGHT: 167 LBS | HEART RATE: 79 BPM | OXYGEN SATURATION: 98 %

## 2020-09-16 DIAGNOSIS — L73.9 FOLLICULITIS: Primary | ICD-10-CM

## 2020-09-16 DIAGNOSIS — L70.8 OTHER ACNE: ICD-10-CM

## 2020-09-16 PROCEDURE — 99214 OFFICE O/P EST MOD 30 MIN: CPT | Performed by: NURSE PRACTITIONER

## 2020-09-16 RX ORDER — MUPIROCIN CALCIUM 20 MG/G
CREAM TOPICAL 2 TIMES DAILY
Qty: 30 G | Refills: 0 | Status: SHIPPED | OUTPATIENT
Start: 2020-09-16 | End: 2020-09-23

## 2020-09-16 RX ORDER — SULFAMETHOXAZOLE AND TRIMETHOPRIM 800; 160 MG/1; MG/1
1 TABLET ORAL 2 TIMES DAILY
Qty: 10 TABLET | Refills: 0 | Status: SHIPPED | OUTPATIENT
Start: 2020-09-16 | End: 2020-09-21

## 2020-09-16 NOTE — PROGRESS NOTES
"Date: 2020    Name: Tere MCGRATH  : 1965    Chief Complaint:   Chief Complaint   Patient presents with   • Follow-up     bump on left armpit       HPI:  Tere MCGRATH is a 55 y.o. female presents with a bump in her left axilla that has been present for over a week.  She has acne, takes aldactone daily and uses retin-a gel 4-5 x a week.  This is an effective regimen for her.    Bump has grown larger, more painful in the past 2-3 days.  She has poked it with a needle, tried to express contents without success.  She has leftover mupirocin from treating folliculitis at [facility],  a few months ago.  Applying it has not had an effect on lump.  She has developed 2-3 more lesions in left axilla, 2 lesions in right axilla; unsure when they appeared.  Denies fever, chills, malaise, myalgia, fatigue. She changes disposable razors frequently.  Has begun using a different scented deodorant; same brand.      History:  The following portions of the patient's history were reviewed and updated as appropriate: allergies, current medications, past medical history, family history, surgical history, social history and problem list.     ROS:  Review of Systems   Constitutional: Negative.    Respiratory: Negative.    Cardiovascular: Negative.        VS:  Vitals:    20 1135   BP: 122/70   Pulse: 79   Temp: 97.7 °F (36.5 °C)   TempSrc: Temporal   SpO2: 98%   Weight: 75.8 kg (167 lb)   Height: 170.2 cm (67\")     Body mass index is 26.16 kg/m².    PE:  Physical Exam  Constitutional:       Appearance: Normal appearance.   HENT:      Head: Normocephalic.      Right Ear: External ear normal.      Left Ear: External ear normal.   Eyes:      Conjunctiva/sclera: Conjunctivae normal.      Pupils: Pupils are equal, round, and reactive to light.   Neck:      Musculoskeletal: Normal range of motion and neck supple.   Cardiovascular:      Rate and Rhythm: Normal rate and regular rhythm.      Pulses: Normal pulses.      " Heart sounds: Normal heart sounds.   Pulmonary:      Effort: Pulmonary effort is normal.      Breath sounds: Normal breath sounds.   Skin:     General: Skin is warm.      Capillary Refill: Capillary refill takes less than 2 seconds.      Comments: Scattered erythematous hair follicles bilateral axilla.  One is 1 mm, tender to touch, nonfluctuant; the rest are < 1 mm, nontender.  No drainage, warmth from lesions.     Neurological:      Mental Status: She is alert and oriented to person, place, and time.      Coordination: Coordination normal.      Gait: Gait normal.         Assessment/Plan:  Tere was seen today for follow-up.    Diagnoses and all orders for this visit:    Folliculitis  -     mupirocin (Bactroban) 2 % cream; Apply  topically to the appropriate area as directed 2 (Two) Times a Day for 7 days.  -     sulfamethoxazole-trimethoprim (Bactrim DS) 800-160 MG per tablet; Take 1 tablet by mouth 2 (Two) Times a Day for 5 days. Prescription given, patient advised only to take if symptoms fail to improve after 2-3 day treatment with bactroban.  Patient agrees to this plan  - Keep axilla dry, clean  - Avoid shaving axilla for the next 10-14 days.  Continue to change razors frequently.  - Stop using current deodorant, switch to old deodorant    Other acne        - Continue current treatment plan    Return for Next scheduled follow up.

## 2021-01-26 ENCOUNTER — OFFICE VISIT (OUTPATIENT)
Dept: INTERNAL MEDICINE | Facility: CLINIC | Age: 56
End: 2021-01-26

## 2021-01-26 VITALS
BODY MASS INDEX: 25.27 KG/M2 | TEMPERATURE: 97 F | OXYGEN SATURATION: 98 % | HEART RATE: 87 BPM | HEIGHT: 67 IN | WEIGHT: 161 LBS | SYSTOLIC BLOOD PRESSURE: 120 MMHG | DIASTOLIC BLOOD PRESSURE: 62 MMHG

## 2021-01-26 DIAGNOSIS — Z00.00 ANNUAL PHYSICAL EXAM: Primary | ICD-10-CM

## 2021-01-26 DIAGNOSIS — E78.2 MIXED HYPERLIPIDEMIA: ICD-10-CM

## 2021-01-26 DIAGNOSIS — L70.8 OTHER ACNE: ICD-10-CM

## 2021-01-26 DIAGNOSIS — R13.14 PHARYNGOESOPHAGEAL DYSPHAGIA: ICD-10-CM

## 2021-01-26 PROBLEM — Z76.89 ENCOUNTER TO ESTABLISH CARE: Status: RESOLVED | Noted: 2018-01-24 | Resolved: 2021-01-26

## 2021-01-26 LAB
ALBUMIN SERPL-MCNC: 4.7 G/DL (ref 3.5–5.2)
ALBUMIN/GLOB SERPL: 1.8 G/DL
ALP SERPL-CCNC: 64 U/L (ref 39–117)
ALT SERPL-CCNC: 39 U/L (ref 1–33)
AST SERPL-CCNC: 22 U/L (ref 1–32)
BASOPHILS # BLD AUTO: 0.05 10*3/MM3 (ref 0–0.2)
BASOPHILS NFR BLD AUTO: 0.6 % (ref 0–1.5)
BILIRUB SERPL-MCNC: 0.7 MG/DL (ref 0–1.2)
BUN SERPL-MCNC: 16 MG/DL (ref 6–20)
BUN/CREAT SERPL: 16.3 (ref 7–25)
CALCIUM SERPL-MCNC: 10.5 MG/DL (ref 8.6–10.5)
CHLORIDE SERPL-SCNC: 100 MMOL/L (ref 98–107)
CHOLEST SERPL-MCNC: 176 MG/DL (ref 0–200)
CHOLEST/HDLC SERPL: 4.19 {RATIO}
CO2 SERPL-SCNC: 28.1 MMOL/L (ref 22–29)
CREAT SERPL-MCNC: 0.98 MG/DL (ref 0.57–1)
EOSINOPHIL # BLD AUTO: 0.09 10*3/MM3 (ref 0–0.4)
EOSINOPHIL NFR BLD AUTO: 1.1 % (ref 0.3–6.2)
ERYTHROCYTE [DISTWIDTH] IN BLOOD BY AUTOMATED COUNT: 13.1 % (ref 12.3–15.4)
GLOBULIN SER CALC-MCNC: 2.6 GM/DL
GLUCOSE SERPL-MCNC: 97 MG/DL (ref 65–99)
HCT VFR BLD AUTO: 42.8 % (ref 34–46.6)
HDLC SERPL-MCNC: 42 MG/DL (ref 40–60)
HGB BLD-MCNC: 14.5 G/DL (ref 12–15.9)
IMM GRANULOCYTES # BLD AUTO: 0.02 10*3/MM3 (ref 0–0.05)
IMM GRANULOCYTES NFR BLD AUTO: 0.3 % (ref 0–0.5)
LDLC SERPL CALC-MCNC: 108 MG/DL (ref 0–100)
LYMPHOCYTES # BLD AUTO: 2.32 10*3/MM3 (ref 0.7–3.1)
LYMPHOCYTES NFR BLD AUTO: 29.6 % (ref 19.6–45.3)
MCH RBC QN AUTO: 31.2 PG (ref 26.6–33)
MCHC RBC AUTO-ENTMCNC: 33.9 G/DL (ref 31.5–35.7)
MCV RBC AUTO: 92 FL (ref 79–97)
MONOCYTES # BLD AUTO: 0.51 10*3/MM3 (ref 0.1–0.9)
MONOCYTES NFR BLD AUTO: 6.5 % (ref 5–12)
NEUTROPHILS # BLD AUTO: 4.85 10*3/MM3 (ref 1.7–7)
NEUTROPHILS NFR BLD AUTO: 61.9 % (ref 42.7–76)
NRBC BLD AUTO-RTO: 0 /100 WBC (ref 0–0.2)
PLATELET # BLD AUTO: 306 10*3/MM3 (ref 140–450)
POTASSIUM SERPL-SCNC: 4.7 MMOL/L (ref 3.5–5.2)
PROT SERPL-MCNC: 7.3 G/DL (ref 6–8.5)
RBC # BLD AUTO: 4.65 10*6/MM3 (ref 3.77–5.28)
SODIUM SERPL-SCNC: 139 MMOL/L (ref 136–145)
TRIGL SERPL-MCNC: 144 MG/DL (ref 0–150)
VLDLC SERPL CALC-MCNC: 26 MG/DL (ref 5–40)
WBC # BLD AUTO: 7.84 10*3/MM3 (ref 3.4–10.8)

## 2021-01-26 PROCEDURE — 99396 PREV VISIT EST AGE 40-64: CPT | Performed by: INTERNAL MEDICINE

## 2021-01-26 NOTE — PROGRESS NOTES
"Chief Complaint   Patient presents with   • Annual Exam     Physical only.      Subjective   Tere MCGRATH is a 55 y.o. female.     Patient here today for annual physical exam.  PMH of hyperlipidemia, vitamin D deficiency, allergies, GERD and acne.  HLD- she has been eating a heart healthy diet recently.  She has not been exercising.  She did take omega fish oil for a while.     Acne-  She is taking aldactone once a day for her acne.  Her acne has been well controlled overall.  She continues to see derm.   Vitamin D deficiency- she is taking vit D 5000 u a day, as well as a MVI  Dysphagia- she may take TUMS as needed  No recent allergies, not on any meds right now  HCM- her mammogram and Pap were completed in 2020 and colonoscopy in 2019.  She has had Hep A vaccines.  She declines flu vaccine.   She did have COVID in November and has been fatigued since then.    She sees the dentist and eye doctor regularly.    Wears her seatbelt and does not text and drive.   She has an occasional drink of wine.  Does not smoke.         The following portions of the patient's history were reviewed and updated as appropriate: allergies, current medications, past family history, past medical history, past social history, past surgical history and problem list.    Review of Systems   Constitutional: Positive for fatigue.   HENT: Negative.    Eyes: Negative.    Respiratory: Negative.    Cardiovascular: Negative.    Gastrointestinal: Negative.    Endocrine: Negative.    Genitourinary: Negative.    Musculoskeletal: Negative.    Skin:        Mild acne, well controlled with Aldactone and Retin-A   Allergic/Immunologic: Negative.    Neurological: Negative.    Hematological: Negative.    Psychiatric/Behavioral: Positive for sleep disturbance.       Objective   /62   Pulse 87   Temp 97 °F (36.1 °C)   Ht 170.2 cm (67\")   Wt 73 kg (161 lb)   SpO2 98%   BMI 25.22 kg/m²   Body mass index is 25.22 kg/m².  Physical Exam  Vitals signs " and nursing note reviewed.   Constitutional:       General: She is not in acute distress.     Appearance: Normal appearance. She is well-developed. She is not ill-appearing.      Comments: Kind and pleasant female, appears her age and is in no distress today   HENT:      Head: Normocephalic and atraumatic.      Right Ear: Tympanic membrane, ear canal and external ear normal.      Left Ear: Tympanic membrane, ear canal and external ear normal.   Eyes:      General:         Right eye: No discharge.         Left eye: No discharge.      Extraocular Movements: Extraocular movements intact.      Conjunctiva/sclera: Conjunctivae normal.      Pupils: Pupils are equal, round, and reactive to light.   Neck:      Musculoskeletal: Normal range of motion and neck supple.      Thyroid: No thyromegaly.      Comments: No thyromegaly  Cardiovascular:      Rate and Rhythm: Normal rate and regular rhythm.      Pulses: Normal pulses.      Heart sounds: Normal heart sounds. No murmur.      Comments: No carotid bruits  Pulmonary:      Effort: Pulmonary effort is normal. No respiratory distress.      Breath sounds: Normal breath sounds. No wheezing.   Abdominal:      General: Bowel sounds are normal. There is no distension.      Palpations: Abdomen is soft.      Tenderness: There is no abdominal tenderness.   Musculoskeletal: Normal range of motion.      Right lower leg: No edema.      Left lower leg: No edema.   Lymphadenopathy:      Cervical: No cervical adenopathy.   Skin:     General: Skin is warm and dry.      Findings: No rash.   Neurological:      General: No focal deficit present.      Mental Status: She is alert and oriented to person, place, and time. Mental status is at baseline.      Cranial Nerves: No cranial nerve deficit.      Motor: No weakness.      Coordination: Coordination normal.      Gait: Gait normal.   Psychiatric:         Mood and Affect: Mood normal.         Behavior: Behavior normal.         Thought Content:  Thought content normal.         Judgment: Judgment normal.         Assessment/Plan   Tere MCGRATH is here today and the following problems have been addressed:      Diagnoses and all orders for this visit:    1. Annual physical exam (Primary)  -     CBC & Differential  -     Comprehensive Metabolic Panel  -     Lipid Panel With / Chol / HDL Ratio    2. Mixed hyperlipidemia  -     Comprehensive Metabolic Panel  -     Lipid Panel With / Chol / HDL Ratio    3. Other acne    4. Pharyngoesophageal dysphagia  -     CBC & Differential    Labs as noted  Recommend annual eye doctor appointment, or as necessary  See your dentist twice a year.  Recommend that you brush teeth twice daily and floss minimum of once daily  Recommend regular seatbelt use  Do not text or use phone while driving  Follow heart healthy/low cholesterol diet  Avoid processed/fried foods/fast food  Exercise as tolerated up to 30 minutes 5 days a week  Take all medications as prescribed  See dermatologist for follow up of acne, on aldactone and retin A for acne  Use TUMS prn for GERD  Her mammogram and colonoscopy are UTD-colonoscopy revealed serrated polyp, repeat is due in 2022 with Dr. Newman  She declines flu vaccine  Continue vitamin D 5000 units daily    RTC 1 yr for annual      Please note that portions of this note were completed with a voice recognition program.  Efforts were made to edit dictation, but occasionally words are mistranscribed.

## 2021-01-27 DIAGNOSIS — R79.89 ELEVATED LFTS: Primary | ICD-10-CM

## 2021-01-29 LAB
Lab: NORMAL
SPECIMEN STATUS: NORMAL
WRITTEN AUTHORIZATION: NORMAL

## 2021-01-29 NOTE — PROGRESS NOTES
Please add a copy of this hepatitis C antibody to letter with all other labs that are being sent to patient.  I think I already sent her a letter earlier this week.

## 2021-07-07 ENCOUNTER — TELEPHONE (OUTPATIENT)
Dept: INTERNAL MEDICINE | Facility: CLINIC | Age: 56
End: 2021-07-07

## 2021-07-07 NOTE — TELEPHONE ENCOUNTER
Caller: Tere MCGRATH    Relationship: Self    Best call back number: [phone]    What orders are you requesting (i.e. lab or imaging): [facility] FOR HER DERMATOLIST HER ALT WAS HIGH.    In what timeframe would the patient need to come in: Thursday OR FRIDAY    Where will you receive your lab/imaging services: OFFICE LAB    Additional notes: PATIENT WILL COME IN FOR HER LABS TOMORROW OR Friday MORNING FOR HER Friday APPOINTMENT.

## 2021-07-08 DIAGNOSIS — E78.2 MIXED HYPERLIPIDEMIA: Primary | ICD-10-CM

## 2021-07-09 LAB
ALBUMIN SERPL-MCNC: 4.8 G/DL (ref 3.5–5.2)
ALBUMIN/GLOB SERPL: 2 G/DL
ALP SERPL-CCNC: 67 U/L (ref 39–117)
ALT SERPL-CCNC: 37 U/L (ref 1–33)
AST SERPL-CCNC: 15 U/L (ref 1–32)
BILIRUB SERPL-MCNC: 0.7 MG/DL (ref 0–1.2)
BUN SERPL-MCNC: 16 MG/DL (ref 6–20)
BUN/CREAT SERPL: 17 (ref 7–25)
CALCIUM SERPL-MCNC: 10.4 MG/DL (ref 8.6–10.5)
CHLORIDE SERPL-SCNC: 104 MMOL/L (ref 98–107)
CO2 SERPL-SCNC: 26.2 MMOL/L (ref 22–29)
CREAT SERPL-MCNC: 0.94 MG/DL (ref 0.57–1)
GLOBULIN SER CALC-MCNC: 2.4 GM/DL
GLUCOSE SERPL-MCNC: 99 MG/DL (ref 65–99)
POTASSIUM SERPL-SCNC: 4.5 MMOL/L (ref 3.5–5.2)
PROT SERPL-MCNC: 7.2 G/DL (ref 6–8.5)
SODIUM SERPL-SCNC: 142 MMOL/L (ref 136–145)

## 2021-07-10 ENCOUNTER — OFFICE VISIT (OUTPATIENT)
Dept: INTERNAL MEDICINE | Facility: CLINIC | Age: 56
End: 2021-07-10

## 2021-07-10 VITALS
SYSTOLIC BLOOD PRESSURE: 122 MMHG | WEIGHT: 161.8 LBS | TEMPERATURE: 98.2 F | OXYGEN SATURATION: 98 % | BODY MASS INDEX: 25.39 KG/M2 | HEIGHT: 67 IN | DIASTOLIC BLOOD PRESSURE: 68 MMHG | RESPIRATION RATE: 16 BRPM | HEART RATE: 90 BPM

## 2021-07-10 DIAGNOSIS — R74.01 ELEVATED ALT MEASUREMENT: Primary | ICD-10-CM

## 2021-07-10 LAB — HCV AB S/CO SERPL IA: <0.1 S/CO RATIO (ref 0–0.9)

## 2021-07-10 PROCEDURE — 99213 OFFICE O/P EST LOW 20 MIN: CPT | Performed by: FAMILY MEDICINE

## 2021-07-10 RX ORDER — HYDROQUINONE 40 MG/G
1 CREAM TOPICAL 2 TIMES DAILY PRN
COMMUNITY
Start: 2021-07-06

## 2021-07-10 NOTE — PROGRESS NOTES
"Tere MCGRATH is a 56 y.o. female.    Chief Complaint   Patient presents with   • Abnormal Lab     pt wants to discuss high ALT on labs from January and then again yesterday       HPI   Patient has had a slight increase in her ALT over the last couple of years.  Patient sees COTY Sims for dermatology.  She has been on spironolactone for years for acne.  The medication is written as twice a day, but she only takes it once a day.  She does feel like the medication helps to control her acne.  She was told recently by her dermatology provider that spironolactone on rare occasions can cause an elevation in liver enzymes and was not comfortable writing the medication any longer unless liver enzymes improved.  Patient states her son does have fatty liver disease.  She plans to start eating healthier and start exercising.     The following portions of the patient's history were reviewed and updated as appropriate: allergies, current medications, past family history, past medical history, past social history, past surgical history and problem list.     No Known Allergies      Current Outpatient Medications:   •  hydroquinone 4 % cream, Apply 1 application topically to the appropriate area as directed 2 (Two) Times a Day As Needed., Disp: , Rfl:   •  spironolactone (ALDACTONE) 50 MG tablet, Take 50 mg by mouth Daily., Disp: , Rfl:   •  tretinoin (RETIN-A) 0.025 % cream, APPLY TOPICALLY TO THE FACE WITH MOISTURIZER EVERY NIGHT AT BEDTIME, Disp: , Rfl:     ROS    Review of Systems   Constitutional: Negative for chills and fever.   Respiratory: Negative for shortness of breath.    Gastrointestinal: Negative for abdominal pain, constipation and diarrhea.       Vitals:    07/10/21 1049   BP: 122/68   BP Location: Left arm   Patient Position: Sitting   Cuff Size: Adult   Pulse: 90   Resp: 16   Temp: 98.2 °F (36.8 °C)   TempSrc: Temporal   SpO2: 98%   Weight: 73.4 kg (161 lb 12.8 oz)   Height: 170.2 cm (67\")     Body mass " index is 25.34 kg/m².    Physical Exam     Physical Exam  Constitutional:       General: She is not in acute distress.     Appearance: She is well-developed.   HENT:      Head: Normocephalic and atraumatic.      Right Ear: External ear normal.      Left Ear: External ear normal.   Eyes:      Extraocular Movements: Extraocular movements intact.      Conjunctiva/sclera: Conjunctivae normal.   Cardiovascular:      Rate and Rhythm: Normal rate.   Pulmonary:      Effort: Pulmonary effort is normal. No respiratory distress.   Neurological:      Mental Status: She is alert and oriented to person, place, and time.      Cranial Nerves: No cranial nerve deficit.   Psychiatric:         Mood and Affect: Mood normal.         Behavior: Behavior normal.         Assessment/Plan    Problems Addressed this Visit     None      Visit Diagnoses     Elevated ALT measurement    -  Primary        Discussed with patient the change in reference range in [facility] for liver enzymes.  Her ALT is very minimally elevated per current reference range, but would be considered within the normal range less than 2 years ago.  Advised the patient I do not believe the spironolactone has resulted in liver enzyme abnormalities, but would be more likely to cause a decline in kidney function.  Reviewed lab redraw from yesterday with ALT of 37.  Letter written to dermatology discussing slight increase is not likely related to the medication and I do not feel concerned with the current ALT reading.  Letter will be faxed to dermatology.     No orders of the defined types were placed in this encounter.      No orders of the defined types were placed in this encounter.      Return for Next scheduled follow up.    Laura Roberts DO

## 2022-02-11 ENCOUNTER — OFFICE VISIT (OUTPATIENT)
Dept: INTERNAL MEDICINE | Facility: CLINIC | Age: 57
End: 2022-02-11

## 2022-02-11 VITALS
DIASTOLIC BLOOD PRESSURE: 80 MMHG | WEIGHT: 166 LBS | BODY MASS INDEX: 26.06 KG/M2 | HEART RATE: 73 BPM | OXYGEN SATURATION: 99 % | TEMPERATURE: 97 F | SYSTOLIC BLOOD PRESSURE: 120 MMHG | HEIGHT: 67 IN

## 2022-02-11 DIAGNOSIS — E78.2 MIXED HYPERLIPIDEMIA: ICD-10-CM

## 2022-02-11 DIAGNOSIS — E55.9 VITAMIN D DEFICIENCY: ICD-10-CM

## 2022-02-11 DIAGNOSIS — Z00.00 ANNUAL PHYSICAL EXAM: Primary | ICD-10-CM

## 2022-02-11 DIAGNOSIS — L05.91 CYST NEAR TAILBONE: ICD-10-CM

## 2022-02-11 LAB
ALBUMIN SERPL-MCNC: 4.6 G/DL (ref 3.5–5.2)
ALBUMIN/GLOB SERPL: 1.8 G/DL
ALP SERPL-CCNC: 68 U/L (ref 39–117)
ALT SERPL-CCNC: 40 U/L (ref 1–33)
AST SERPL-CCNC: 17 U/L (ref 1–32)
BASOPHILS # BLD AUTO: 0.04 10*3/MM3 (ref 0–0.2)
BASOPHILS NFR BLD AUTO: 0.6 % (ref 0–1.5)
BILIRUB SERPL-MCNC: 0.6 MG/DL (ref 0–1.2)
BUN SERPL-MCNC: 14 MG/DL (ref 6–20)
BUN/CREAT SERPL: 16.1 (ref 7–25)
CALCIUM SERPL-MCNC: 10.1 MG/DL (ref 8.6–10.5)
CHLORIDE SERPL-SCNC: 104 MMOL/L (ref 98–107)
CHOLEST SERPL-MCNC: 186 MG/DL (ref 0–200)
CHOLEST/HDLC SERPL: 3.8 {RATIO}
CO2 SERPL-SCNC: 25.6 MMOL/L (ref 22–29)
CREAT SERPL-MCNC: 0.87 MG/DL (ref 0.57–1)
EOSINOPHIL # BLD AUTO: 0.13 10*3/MM3 (ref 0–0.4)
EOSINOPHIL NFR BLD AUTO: 1.8 % (ref 0.3–6.2)
ERYTHROCYTE [DISTWIDTH] IN BLOOD BY AUTOMATED COUNT: 12.4 % (ref 12.3–15.4)
GLOBULIN SER CALC-MCNC: 2.5 GM/DL
GLUCOSE SERPL-MCNC: 101 MG/DL (ref 65–99)
HCT VFR BLD AUTO: 41.4 % (ref 34–46.6)
HDLC SERPL-MCNC: 49 MG/DL (ref 40–60)
HGB BLD-MCNC: 14.5 G/DL (ref 12–15.9)
IMM GRANULOCYTES # BLD AUTO: 0.02 10*3/MM3 (ref 0–0.05)
IMM GRANULOCYTES NFR BLD AUTO: 0.3 % (ref 0–0.5)
LDLC SERPL CALC-MCNC: 113 MG/DL (ref 0–100)
LYMPHOCYTES # BLD AUTO: 2.59 10*3/MM3 (ref 0.7–3.1)
LYMPHOCYTES NFR BLD AUTO: 35.6 % (ref 19.6–45.3)
MCH RBC QN AUTO: 31.7 PG (ref 26.6–33)
MCHC RBC AUTO-ENTMCNC: 35 G/DL (ref 31.5–35.7)
MCV RBC AUTO: 90.6 FL (ref 79–97)
MONOCYTES # BLD AUTO: 0.39 10*3/MM3 (ref 0.1–0.9)
MONOCYTES NFR BLD AUTO: 5.4 % (ref 5–12)
NEUTROPHILS # BLD AUTO: 4.1 10*3/MM3 (ref 1.7–7)
NEUTROPHILS NFR BLD AUTO: 56.3 % (ref 42.7–76)
NRBC BLD AUTO-RTO: 0 /100 WBC (ref 0–0.2)
PLATELET # BLD AUTO: 297 10*3/MM3 (ref 140–450)
POTASSIUM SERPL-SCNC: 4.4 MMOL/L (ref 3.5–5.2)
PROT SERPL-MCNC: 7.1 G/DL (ref 6–8.5)
RBC # BLD AUTO: 4.57 10*6/MM3 (ref 3.77–5.28)
SODIUM SERPL-SCNC: 139 MMOL/L (ref 136–145)
TRIGL SERPL-MCNC: 135 MG/DL (ref 0–150)
VLDLC SERPL CALC-MCNC: 24 MG/DL (ref 5–40)
WBC # BLD AUTO: 7.27 10*3/MM3 (ref 3.4–10.8)

## 2022-02-11 PROCEDURE — 99396 PREV VISIT EST AGE 40-64: CPT | Performed by: INTERNAL MEDICINE

## 2022-02-11 NOTE — PROGRESS NOTES
Chief Complaint   Patient presents with   • Annual Exam     Physical only.     Subjective   Tere MCGRATH is a 56 y.o. female.     Patient here today for annual physical exam.  PMH of hyperlipidemia, vitamin D deficiency, allergies, GERD and acne.  HLD- she has been eating a heart healthy diet on and off, avoids sweets.  She takes cod liver oil to help with hyperlipidemia.  She does not exercise regularly.  She retired 2 years ago.  Since that time is having difficulty with her weight.  She admits that she does not eat healthy and does not exercise regularly  Acne-  She is taking aldactone once a day for her acne.  She is also on Retin-A and hydroxyquinone 4% cream per dermatology.  Vitamin D deficiency- she is taking vit D 5000 u a day, as well as Vit C  No recent allergies, not on any meds right now  HCM- her mammogram and Pap were completed in 2020 and colonoscopy in 2019-she will call Dr Del Cid for appt in fall for scope.  She has had Hep A vaccines.  She declines COVID and flu vaccine.  Tdap 2013.    She has an area on her skin just at bottom of her tail bone that is bothersome.  It hurts on and off.  It occasionally increases with size.  She sees the dentist and eye doctor regularly.    Wears her seatbelt and does not text and drive.   She has an occasional drink of wine.  Does not smoke.        The following portions of the patient's history were reviewed and updated as appropriate: allergies, current medications, past family history, past medical history, past social history, past surgical history and problem list.    Review of Systems   Constitutional: Negative.    HENT: Negative.    Eyes: Negative.    Respiratory: Negative.    Cardiovascular: Negative.    Gastrointestinal: Negative.    Endocrine: Negative.    Skin:        Skin lesion/cyst at base of coccyx/tailbone  Intermittent facial acne problems   Allergic/Immunologic: Negative.    Neurological: Negative.    Hematological: Negative.   "  Psychiatric/Behavioral: Negative.        Objective   /80   Pulse 73   Temp 97 °F (36.1 °C)   Ht 170.2 cm (67\")   Wt 75.3 kg (166 lb)   SpO2 99%   BMI 26.00 kg/m²   Body mass index is 26 kg/m².  Physical Exam  Vitals and nursing note reviewed.   Constitutional:       General: She is not in acute distress.     Appearance: Normal appearance. She is well-developed. She is not ill-appearing.      Comments: Kind and pleasant female, appears stated age and in NAD today   HENT:      Head: Normocephalic and atraumatic.      Right Ear: Tympanic membrane, ear canal and external ear normal.      Left Ear: Tympanic membrane, ear canal and external ear normal.      Nose: No congestion or rhinorrhea.      Mouth/Throat:      Pharynx: No posterior oropharyngeal erythema.   Eyes:      General:         Right eye: No discharge.         Left eye: No discharge.      Extraocular Movements: Extraocular movements intact.      Conjunctiva/sclera: Conjunctivae normal.      Pupils: Pupils are equal, round, and reactive to light.   Neck:      Thyroid: No thyromegaly.      Vascular: No carotid bruit.      Comments: No thyromegaly or mass  Cardiovascular:      Rate and Rhythm: Normal rate and regular rhythm.      Pulses: Normal pulses.      Heart sounds: Normal heart sounds. No murmur heard.      Pulmonary:      Effort: Pulmonary effort is normal. No respiratory distress.      Breath sounds: Normal breath sounds. No wheezing.   Abdominal:      General: Bowel sounds are normal. There is no distension.      Palpations: Abdomen is soft.      Tenderness: There is no abdominal tenderness.   Musculoskeletal:         General: Normal range of motion.      Cervical back: Normal range of motion and neck supple.      Right lower leg: No edema.      Left lower leg: No edema.   Lymphadenopathy:      Cervical: No cervical adenopathy.   Skin:     General: Skin is warm.      Findings: No rash.      Comments: There is an area approximately 1.5 cm in " size at base of coccyx just to the left of rectum that is soft, flesh-colored and slightly tender to palpation, consistent with cyst   Neurological:      General: No focal deficit present.      Mental Status: She is alert and oriented to person, place, and time. Mental status is at baseline.      Cranial Nerves: No cranial nerve deficit.      Motor: No weakness.      Coordination: Coordination normal.      Gait: Gait normal.   Psychiatric:         Mood and Affect: Mood normal.         Behavior: Behavior normal.         Thought Content: Thought content normal.         Judgment: Judgment normal.         Assessment/Plan   Tere MCGRATH is here today and the following problems have been addressed:      Diagnoses and all orders for this visit:    1. Annual physical exam (Primary)  -     CBC & Differential  -     Comprehensive Metabolic Panel  -     Lipid Panel With / Chol / HDL Ratio    2. Mixed hyperlipidemia  -     Comprehensive Metabolic Panel  -     Lipid Panel With / Chol / HDL Ratio    3. Vitamin D deficiency    4. Cyst near tailbone  -     Ambulatory Referral to General Surgery    Labs as noted  Follow low-fat/low-cholesterol diet, avoid processed foods  Exercise for 30 minutes 5 days a week as tolerated-encouraged patient to schedule exercise into her regular calendar to help with weight maintenance  Recommend annual eye doctor appointment, or as necessary  See your dentist twice a year.  Recommend that you brush teeth twice daily and floss minimum of once daily  Recommend regular seatbelt use  Do not text or use phone while driving  Continue daily vitamin D  Follow-up with dermatology regarding adult acne  Patient declines all vaccines  Patient declines to schedule mammogram at this time as she states it always read she has dense breast.  She would like to do research and decide whether or not she wants to do mammogram or other study.  There is no family history of breast cancer to suggest that she needs MRI or  other study at this time  Refer to general surgeon for cystic lesion at base of coccyx  Patient states she will schedule her follow-up colonoscopy with Dr. Newman in the fall      Return to clinic in 1 year for annual exam    Please note that portions of this note were completed with a voice recognition program.  Efforts were made to edit dictation, but occasionally words are mistranscribed.

## 2022-03-04 NOTE — H&P (VIEW-ONLY)
Subjective   Tere CANAS is a 56 y.o. female.   Chief Complaint   Patient presents with   • Cyst     Near tailbone     History of Present Illness   Ms. Canas is a 56-year-old female patient who presents for evaluation of a cystic lesion that is near the coccyx, present for the last 2 months.  She denies drainage but does endorse pain.  She denies ever having had a similar issue.  She has had no recent imaging.  She has not been on antibiotics for this issue.  There is no reported history of perianal abscesses or fistulas.  No recent history of perianal trauma.      The following portions of the patient's history were reviewed and updated as appropriate: allergies, current medications, past family history, past medical history, past social history, past surgical history and problem list.      Patient Active Problem List   Diagnosis   • Mixed hyperlipidemia   • Vitamin D deficiency   • Other acne   • Pharyngoesophageal dysphagia   • Screening mammogram, encounter for   • Encounter for screening colonoscopy   • Environmental allergies   • Annual physical exam   • Cyst near tailbone   • Perianal cyst       Past Medical History:   Diagnosis Date   • Ear piercing    • Hyperlipidemia    • Seasonal allergies        Past Surgical History:   Procedure Laterality Date   • COLONOSCOPY     • D & C CERVICAL BIOPSY     • ENDOSCOPY  2004   • SKIN BIOPSY     • WISDOM TOOTH EXTRACTION         Medications:   No current facility-administered medications on file prior to visit.     Current Outpatient Medications on File Prior to Visit   Medication Sig   • Ascorbic Acid (VITAMIN C PO) Take  by mouth.   • COD LIVER OIL PO Take  by mouth.   • spironolactone (ALDACTONE) 50 MG tablet Take 50 mg by mouth Daily.   • tretinoin (RETIN-A) 0.025 % cream APPLY TOPICALLY TO THE FACE WITH MOISTURIZER EVERY NIGHT AT BEDTIME   • Ascorbic Acid (VITAMIN C PO) Take  by mouth.   • hydroquinone 4 % cream Apply 1 application topically to the appropriate  "area as directed 2 (Two) Times a Day As Needed.       Allergies:   No Known Allergies      Family History   Problem Relation Age of Onset   • Arthritis Mother    • Hiatal hernia Mother    • Atrial fibrillation Mother    • Stroke Mother    • Heart disease Father    • Liver disease Son         fatty liver       Social History     Socioeconomic History   • Marital status:    Tobacco Use   • Smoking status: Former Smoker     Packs/day: 1.00     Years: 5.00     Pack years: 5.00     Types: Cigarettes     Quit date:      Years since quittin.2   • Smokeless tobacco: Never Used   Vaping Use   • Vaping Use: Never used   Substance and Sexual Activity   • Alcohol use: Yes     Comment: Occasionally    • Drug use: No   • Sexual activity: Defer       Review of Systems   Constitutional: Negative for chills, fever and unexpected weight change.   HENT: Negative for hearing loss, trouble swallowing and voice change.    Eyes: Negative for visual disturbance.   Respiratory: Negative for apnea, cough, chest tightness, shortness of breath and wheezing.    Cardiovascular: Negative for chest pain, palpitations and leg swelling.   Gastrointestinal: Negative for abdominal distention, abdominal pain, anal bleeding, blood in stool, constipation, diarrhea, nausea, rectal pain and vomiting.   Endocrine: Negative for cold intolerance and heat intolerance.   Genitourinary: Negative for difficulty urinating, dysuria and flank pain.   Musculoskeletal: Negative for back pain and gait problem.   Skin: Negative for color change, rash and wound.   Neurological: Negative for dizziness, syncope, speech difficulty, weakness, light-headedness, numbness and headaches.   Hematological: Negative for adenopathy. Does not bruise/bleed easily.   Psychiatric/Behavioral: Negative for confusion. The patient is not nervous/anxious.        Objective    /68   Pulse 60   Temp 97.3 °F (36.3 °C) (Temporal)   Resp 18   Ht 170.2 cm (67\")   Wt 72.7 " kg (160 lb 3.2 oz)   SpO2 99%   BMI 25.09 kg/m²     Physical Exam  Constitutional:       Appearance: She is well-developed.   HENT:      Head: Normocephalic and atraumatic.   Cardiovascular:      Rate and Rhythm: Regular rhythm.   Pulmonary:      Effort: Pulmonary effort is normal.   Abdominal:      General: There is no distension.      Palpations: Abdomen is soft.      Tenderness: There is no abdominal tenderness.   Genitourinary:     Comments: In the left perianal region, distal to the tip of the coccyx, and approximately the 11 o'clock position when the patient is in the prone position, there is a 1 cm cystic lesion which is tender to palpation.  Musculoskeletal:      Cervical back: Neck supple.   Skin:     General: Skin is warm and dry.   Neurological:      Mental Status: She is alert and oriented to person, place, and time.   Psychiatric:         Behavior: Behavior normal.             Assessment/Plan   Diagnoses and all orders for this visit:    1. Perianal cyst (Primary)  -     Case Request; Standing  -     Case Request    Other orders  -     Follow Anesthesia Guidelines / Standing Orders; Future  -     Provide NPO Instructions to Patient; Future  -     Chlorhexidine Skin Prep; Future  -     Obtain Informed Consent      Ms. Canas is a 56-year-old female patient with a 2-month history of a cystic lesion of the perianal region of uncertain etiology.  We discussed definitive management with excision.  I recommended that this be performed in the OR due to the location of the lesion, and the uncertain nature of the cystic lesions etiology.  This could be an eccentrically placed pilonidal cyst.  Alternatively, it could represent a true perianal cyst that exists only in the subcutaneous tissue.  Less likely would be a abscess, or a fistula tract that has not yet reached to the surface.  We discussed the risks, benefits, and alternatives.  She is agreeable, and has verbalized her informed consent.  She understands  that she will need to be n.p.o. the evening prior to the scheduled surgical procedure.  My office will assist her making arrangements for scheduling, and preadmission testing.

## 2022-03-08 ENCOUNTER — OFFICE VISIT (OUTPATIENT)
Dept: SURGERY | Facility: CLINIC | Age: 57
End: 2022-03-08

## 2022-03-08 VITALS
HEART RATE: 60 BPM | OXYGEN SATURATION: 99 % | DIASTOLIC BLOOD PRESSURE: 68 MMHG | WEIGHT: 160.2 LBS | SYSTOLIC BLOOD PRESSURE: 118 MMHG | HEIGHT: 67 IN | RESPIRATION RATE: 18 BRPM | BODY MASS INDEX: 25.15 KG/M2 | TEMPERATURE: 97.3 F

## 2022-03-08 DIAGNOSIS — K62.89 PERIANAL CYST: Primary | ICD-10-CM

## 2022-03-08 PROCEDURE — 99203 OFFICE O/P NEW LOW 30 MIN: CPT | Performed by: SURGERY

## 2022-03-09 RX ORDER — HEPARIN SODIUM 5000 [USP'U]/ML
5000 INJECTION, SOLUTION INTRAVENOUS; SUBCUTANEOUS ONCE
Status: CANCELLED | OUTPATIENT
Start: 2022-03-09

## 2022-03-09 RX ORDER — SODIUM CHLORIDE, SODIUM LACTATE, POTASSIUM CHLORIDE, CALCIUM CHLORIDE 600; 310; 30; 20 MG/100ML; MG/100ML; MG/100ML; MG/100ML
50 INJECTION, SOLUTION INTRAVENOUS CONTINUOUS
Status: CANCELLED | OUTPATIENT
Start: 2022-03-09

## 2022-03-09 RX ORDER — SODIUM CHLORIDE 0.9 % (FLUSH) 0.9 %
10 SYRINGE (ML) INJECTION AS NEEDED
Status: CANCELLED | OUTPATIENT
Start: 2022-03-09

## 2022-03-09 RX ORDER — SODIUM CHLORIDE 0.9 % (FLUSH) 0.9 %
10 SYRINGE (ML) INJECTION EVERY 12 HOURS SCHEDULED
Status: CANCELLED | OUTPATIENT
Start: 2022-03-09

## 2022-03-10 PROBLEM — K62.89 PERIANAL CYST: Status: ACTIVE | Noted: 2022-03-10

## 2022-03-10 NOTE — PRE-PROCEDURE INSTRUCTIONS
PAT phone history completed with pt for upcoming procedure on 3/11/22 with Dr. Pastor.    PAT PASS GIVEN/REVIEWED WITH PT.  VERBALIZED UNDERSTANDING OF THE FOLLOWING:  DO NOT EAT, DRINK, SMOKE, USE SMOKELESS TOBACCO OR CHEW GUM AFTER MIDNIGHT THE NIGHT BEFORE SURGERY.  THIS ALSO INCLUDES HARD CANDIES AND MINTS.    DO NOT SHAVE THE AREA TO BE OPERATED ON AT LEAST 48 HOURS PRIOR TO THE PROCEDURE.  DO NOT WEAR MAKE UP OR NAIL POLISH.  DO NOT LEAVE IN ANY PIERCING OR WEAR JEWELRY THE DAY OF SURGERY.      DO NOT USE ADHESIVES IF YOU WEAR DENTURES.    DO NOT WEAR EYE CONTACTS; BRING IN YOUR GLASSES.    ONLY TAKE MEDICATION THE MORNING OF YOUR PROCEDURE IF INSTRUCTED BY YOUR SURGEON WITH ENOUGH WATER TO SWALLOW THE MEDICATION.  IF YOUR SURGEON DID NOT SPECIFY WHICH MEDICATIONS TO TAKE, YOU WILL NEED TO CALL THEIR OFFICE FOR FURTHER INSTRUCTIONS AND DO AS THEY INSTRUCT.    LEAVE ANYTHING YOU CONSIDER VALUABLE AT HOME.    YOU WILL NEED TO ARRANGE FOR SOMEONE TO DRIVE YOU HOME AFTER SURGERY.  IT IS RECOMMENDED THAT YOU DO NOT DRIVE, WORK, DRINK ALCOHOL OR MAKE MAJOR DECISIONS FOR AT LEAST 24 HOURS AFTER YOUR PROCEDURE IS COMPLETE.      THE DAY OF YOUR PROCEDURE, BRING IN THE FOLLOWING IF APPLICABLE:   PICTURE ID AND INSURANCE/MEDICARE OR MEDICAID CARDS/ANY CO-PAY THAT MAY BE DUE   COPY OF ADVANCED DIRECTIVE/LIVING WILL/POWER OR    CPAP/BIPAP/INHALERS   SKIN PREP SHEET   YOUR PREADMISSION TESTING PASS (IF NOT A PHONE HISTORY)

## 2022-03-11 ENCOUNTER — ANESTHESIA EVENT (OUTPATIENT)
Dept: PERIOP | Facility: HOSPITAL | Age: 57
End: 2022-03-11

## 2022-03-11 ENCOUNTER — HOSPITAL ENCOUNTER (OUTPATIENT)
Facility: HOSPITAL | Age: 57
Setting detail: HOSPITAL OUTPATIENT SURGERY
Discharge: HOME | End: 2022-03-11
Attending: SURGERY | Admitting: SURGERY

## 2022-03-11 ENCOUNTER — ANESTHESIA (OUTPATIENT)
Dept: PERIOP | Facility: HOSPITAL | Age: 57
End: 2022-03-11

## 2022-03-11 VITALS
OXYGEN SATURATION: 98 % | WEIGHT: 160 LBS | BODY MASS INDEX: 25.11 KG/M2 | SYSTOLIC BLOOD PRESSURE: 118 MMHG | HEART RATE: 72 BPM | TEMPERATURE: 97.2 F | HEIGHT: 67 IN | RESPIRATION RATE: 18 BRPM | DIASTOLIC BLOOD PRESSURE: 79 MMHG

## 2022-03-11 DIAGNOSIS — K62.89 PERIANAL CYST: ICD-10-CM

## 2022-03-11 PROCEDURE — 46922 EXCISION OF ANAL LESION(S): CPT | Performed by: SURGERY

## 2022-03-11 PROCEDURE — 25010000002 PROPOFOL 10 MG/ML EMULSION: Performed by: NURSE ANESTHETIST, CERTIFIED REGISTERED

## 2022-03-11 PROCEDURE — 25010000002 MIDAZOLAM PER 1MG: Performed by: NURSE ANESTHETIST, CERTIFIED REGISTERED

## 2022-03-11 PROCEDURE — 25010000002 ONDANSETRON PER 1 MG: Performed by: NURSE ANESTHETIST, CERTIFIED REGISTERED

## 2022-03-11 PROCEDURE — 25010000002 HEPARIN (PORCINE) PER 1000 UNITS: Performed by: SURGERY

## 2022-03-11 PROCEDURE — 0 CEFAZOLIN SODIUM-DEXTROSE 2-3 GM-%(50ML) RECONSTITUTED SOLUTION: Performed by: SURGERY

## 2022-03-11 PROCEDURE — 25010000002 DEXAMETHASONE PER 1 MG: Performed by: NURSE ANESTHETIST, CERTIFIED REGISTERED

## 2022-03-11 PROCEDURE — 25010000002 FENTANYL CITRATE (PF) 50 MCG/ML SOLUTION: Performed by: NURSE ANESTHETIST, CERTIFIED REGISTERED

## 2022-03-11 PROCEDURE — 0 LIDOCAINE 1 % SOLUTION: Performed by: SURGERY

## 2022-03-11 PROCEDURE — 25010000002 METOCLOPRAMIDE PER 10 MG: Performed by: NURSE ANESTHETIST, CERTIFIED REGISTERED

## 2022-03-11 RX ORDER — FENTANYL CITRATE 50 UG/ML
INJECTION, SOLUTION INTRAMUSCULAR; INTRAVENOUS AS NEEDED
Status: DISCONTINUED | OUTPATIENT
Start: 2022-03-11 | End: 2022-03-11 | Stop reason: SURG

## 2022-03-11 RX ORDER — KETAMINE HYDROCHLORIDE 50 MG/ML
INJECTION, SOLUTION, CONCENTRATE INTRAMUSCULAR; INTRAVENOUS AS NEEDED
Status: DISCONTINUED | OUTPATIENT
Start: 2022-03-11 | End: 2022-03-11 | Stop reason: SURG

## 2022-03-11 RX ORDER — PROPOFOL 10 MG/ML
VIAL (ML) INTRAVENOUS AS NEEDED
Status: DISCONTINUED | OUTPATIENT
Start: 2022-03-11 | End: 2022-03-11 | Stop reason: SURG

## 2022-03-11 RX ORDER — LIDOCAINE HYDROCHLORIDE 20 MG/ML
INJECTION, SOLUTION INTRAVENOUS AS NEEDED
Status: DISCONTINUED | OUTPATIENT
Start: 2022-03-11 | End: 2022-03-11 | Stop reason: SURG

## 2022-03-11 RX ORDER — SODIUM CHLORIDE, SODIUM LACTATE, POTASSIUM CHLORIDE, CALCIUM CHLORIDE 600; 310; 30; 20 MG/100ML; MG/100ML; MG/100ML; MG/100ML
50 INJECTION, SOLUTION INTRAVENOUS CONTINUOUS
Status: DISCONTINUED | OUTPATIENT
Start: 2022-03-11 | End: 2022-03-11 | Stop reason: HOSPADM

## 2022-03-11 RX ORDER — HYDROCODONE BITARTRATE AND ACETAMINOPHEN 7.5; 325 MG/1; MG/1
1 TABLET ORAL EVERY 4 HOURS PRN
Qty: 10 TABLET | Refills: 0 | Status: ON HOLD | OUTPATIENT
Start: 2022-03-11 | End: 2022-05-10 | Stop reason: SDUPTHER

## 2022-03-11 RX ORDER — LIDOCAINE HYDROCHLORIDE 10 MG/ML
INJECTION, SOLUTION INFILTRATION; PERINEURAL AS NEEDED
Status: DISCONTINUED | OUTPATIENT
Start: 2022-03-11 | End: 2022-03-11 | Stop reason: HOSPADM

## 2022-03-11 RX ORDER — METOCLOPRAMIDE HYDROCHLORIDE 5 MG/ML
INJECTION INTRAMUSCULAR; INTRAVENOUS AS NEEDED
Status: DISCONTINUED | OUTPATIENT
Start: 2022-03-11 | End: 2022-03-11 | Stop reason: SURG

## 2022-03-11 RX ORDER — DEXAMETHASONE SODIUM PHOSPHATE 4 MG/ML
INJECTION, SOLUTION INTRA-ARTICULAR; INTRALESIONAL; INTRAMUSCULAR; INTRAVENOUS; SOFT TISSUE AS NEEDED
Status: DISCONTINUED | OUTPATIENT
Start: 2022-03-11 | End: 2022-03-11 | Stop reason: SURG

## 2022-03-11 RX ORDER — SODIUM CHLORIDE 0.9 % (FLUSH) 0.9 %
10 SYRINGE (ML) INJECTION AS NEEDED
Status: DISCONTINUED | OUTPATIENT
Start: 2022-03-11 | End: 2022-03-11 | Stop reason: HOSPADM

## 2022-03-11 RX ORDER — HEPARIN SODIUM 5000 [USP'U]/ML
5000 INJECTION, SOLUTION INTRAVENOUS; SUBCUTANEOUS ONCE
Status: COMPLETED | OUTPATIENT
Start: 2022-03-11 | End: 2022-03-11

## 2022-03-11 RX ORDER — MIDAZOLAM HYDROCHLORIDE 2 MG/2ML
INJECTION, SOLUTION INTRAMUSCULAR; INTRAVENOUS AS NEEDED
Status: DISCONTINUED | OUTPATIENT
Start: 2022-03-11 | End: 2022-03-11 | Stop reason: SURG

## 2022-03-11 RX ORDER — BUPIVACAINE HYDROCHLORIDE 2.5 MG/ML
INJECTION, SOLUTION EPIDURAL; INFILTRATION; INTRACAUDAL AS NEEDED
Status: DISCONTINUED | OUTPATIENT
Start: 2022-03-11 | End: 2022-03-11 | Stop reason: HOSPADM

## 2022-03-11 RX ORDER — SODIUM CHLORIDE 0.9 % (FLUSH) 0.9 %
10 SYRINGE (ML) INJECTION EVERY 12 HOURS SCHEDULED
Status: DISCONTINUED | OUTPATIENT
Start: 2022-03-11 | End: 2022-03-11 | Stop reason: HOSPADM

## 2022-03-11 RX ORDER — CEFAZOLIN SODIUM 2 G/50ML
2 SOLUTION INTRAVENOUS ONCE
Status: COMPLETED | OUTPATIENT
Start: 2022-03-11 | End: 2022-03-11

## 2022-03-11 RX ORDER — ONDANSETRON 2 MG/ML
INJECTION INTRAMUSCULAR; INTRAVENOUS AS NEEDED
Status: DISCONTINUED | OUTPATIENT
Start: 2022-03-11 | End: 2022-03-11 | Stop reason: SURG

## 2022-03-11 RX ORDER — MAGNESIUM HYDROXIDE 1200 MG/15ML
LIQUID ORAL AS NEEDED
Status: DISCONTINUED | OUTPATIENT
Start: 2022-03-11 | End: 2022-03-11 | Stop reason: HOSPADM

## 2022-03-11 RX ORDER — ONDANSETRON 2 MG/ML
4 INJECTION INTRAMUSCULAR; INTRAVENOUS ONCE AS NEEDED
Status: DISCONTINUED | OUTPATIENT
Start: 2022-03-11 | End: 2022-03-11 | Stop reason: HOSPADM

## 2022-03-11 RX ADMIN — GLYCOPYRROLATE 0.2 MCG: 0.2 INJECTION, SOLUTION INTRAMUSCULAR; INTRAVITREAL at 11:46

## 2022-03-11 RX ADMIN — KETAMINE HYDROCHLORIDE 50 MG: 50 INJECTION, SOLUTION INTRAMUSCULAR; INTRAVENOUS at 11:46

## 2022-03-11 RX ADMIN — MIDAZOLAM HYDROCHLORIDE 2 MG: 1 INJECTION, SOLUTION INTRAMUSCULAR; INTRAVENOUS at 11:38

## 2022-03-11 RX ADMIN — PROPOFOL 50 MG: 10 INJECTION, EMULSION INTRAVENOUS at 11:39

## 2022-03-11 RX ADMIN — DEXAMETHASONE SODIUM PHOSPHATE 8 MG: 4 INJECTION, SOLUTION INTRAMUSCULAR; INTRAVENOUS at 11:46

## 2022-03-11 RX ADMIN — ONDANSETRON 4 MG: 2 INJECTION INTRAMUSCULAR; INTRAVENOUS at 11:46

## 2022-03-11 RX ADMIN — PROPOFOL 50 MG: 10 INJECTION, EMULSION INTRAVENOUS at 12:00

## 2022-03-11 RX ADMIN — CEFAZOLIN SODIUM 2 G: 2 SOLUTION INTRAVENOUS at 11:45

## 2022-03-11 RX ADMIN — METOCLOPRAMIDE 10 MG: 5 INJECTION, SOLUTION INTRAMUSCULAR; INTRAVENOUS at 11:46

## 2022-03-11 RX ADMIN — FENTANYL CITRATE 100 MCG: 50 INJECTION INTRAMUSCULAR; INTRAVENOUS at 11:46

## 2022-03-11 RX ADMIN — HEPARIN SODIUM 5000 UNITS: 5000 INJECTION, SOLUTION INTRAVENOUS; SUBCUTANEOUS at 11:35

## 2022-03-11 RX ADMIN — SODIUM CHLORIDE, POTASSIUM CHLORIDE, SODIUM LACTATE AND CALCIUM CHLORIDE 50 ML/HR: 600; 310; 30; 20 INJECTION, SOLUTION INTRAVENOUS at 10:42

## 2022-03-11 RX ADMIN — LIDOCAINE HYDROCHLORIDE 100 MG: 20 INJECTION, SOLUTION INTRAVENOUS at 11:46

## 2022-03-11 NOTE — ANESTHESIA POSTPROCEDURE EVALUATION
Patient: Tere MCGRATH    Procedure Summary     Date: 03/11/22 Room / Location: [facility] OR  /  [facility]    Anesthesia Start: 1140 Anesthesia Stop: 1222    Procedure: PERIANAL CYST EXCISION (Left ) Diagnosis:       Perianal cyst      (Perianal cyst [K62.89])    Surgeons: Mayela Pastor MD Provider: Franco Singh CRNA    Anesthesia Type: MAC ASA Status: 2          Anesthesia Type: MAC    Vitals  Vitals Value Taken Time   /79 03/11/22 1251   Temp 97.2 °F (36.2 °C) 03/11/22 1221   Pulse 72 03/11/22 1251   Resp 18 03/11/22 1251   SpO2 98 % 03/11/22 1251           Post Anesthesia Care and Evaluation    Patient location during evaluation: bedside  Patient participation: complete - patient participated  Level of consciousness: awake  Pain score: 0  Pain management: adequate  Airway patency: patent  Anesthetic complications: No anesthetic complications  PONV Status: controlled  Cardiovascular status: acceptable and stable  Respiratory status: acceptable and room air  Hydration status: acceptable

## 2022-03-11 NOTE — ANESTHESIA PREPROCEDURE EVALUATION
Anesthesia Evaluation     Patient summary reviewed and Nursing notes reviewed   no history of anesthetic complications:  NPO Solid Status: > 8 hours  NPO Liquid Status: > 8 hours           Airway   Mallampati: II  TM distance: >3 FB  Neck ROM: full  no difficulty expected  Dental - normal exam     Pulmonary - normal exam   (+) a smoker Former,   Cardiovascular - normal exam    Rhythm: regular  Rate: normal    (+) hyperlipidemia,       Neuro/Psych- negative ROS  GI/Hepatic/Renal/Endo - negative ROS     Musculoskeletal (-) negative ROS    Abdominal    Substance History - negative use     OB/GYN negative ob/gyn ROS         Other - negative ROS                       Anesthesia Plan    ASA 2     MAC   (Pt told that intravenous sedation will be used as the primary anesthetic along with local anesthesia if necessary. Every effort will be made to make sure the patient is comfortable.     The patient was told they may or may not have recall for the procedure. It was further explained that if the MAC was not adequate that a general anesthetic with either an LMA or endotracheal tube would be required.     Will proceed with the plan of care.)  intravenous induction     Anesthetic plan, all risks, benefits, and alternatives have been provided, discussed and informed consent has been obtained with: patient.        CODE STATUS:

## 2022-03-11 NOTE — DISCHARGE INSTRUCTIONS
To assist you in voiding:  Drink plenty of fluids  Listen to running water while attempting to void.    If you are unable to urinate and you have an uncomfortable urge to void or it has been   6 hours since you were discharged, return to the Emergency Room Please follow all post op instructions and follow up appointment time from your physician's office included in your discharge packet.       Rest today  No pushing,pulling,tugging,heavy lifting, or strenuous activity   No major decision making,driving,or drinking alcoholic beverages for 24 hours due to the sedation you received  Always use good hand hygiene/washing technique  No driving on pain medication.

## 2022-03-11 NOTE — ANESTHESIA POSTPROCEDURE EVALUATION
Patient: Tere MCGRATH    Procedure Summary     Date: 03/11/22 Room / Location: [facility] OR  /  [facility]    Anesthesia Start: 1140 Anesthesia Stop: 1222    Procedure: PERIANAL CYST EXCISION (Left ) Diagnosis:       Perianal cyst      (Perianal cyst [K62.89])    Surgeons: Mayela Pastor MD Provider: Franco Singh CRNA    Anesthesia Type: MAC ASA Status: 2          Anesthesia Type: MAC    Vitals  No vitals data found for the desired time range.          Post Anesthesia Care and Evaluation    Patient location during evaluation: PACU  Patient participation: complete - patient participated  Level of consciousness: awake  Pain score: 3  Pain management: adequate  Airway patency: patent  Anesthetic complications: No anesthetic complications  PONV Status: controlled  Cardiovascular status: acceptable and stable  Respiratory status: acceptable and face mask  Hydration status: acceptable

## 2022-03-19 LAB
LAB AP CASE REPORT: NORMAL
PATH REPORT.FINAL DX SPEC: NORMAL

## 2022-03-22 ENCOUNTER — OFFICE VISIT (OUTPATIENT)
Dept: SURGERY | Facility: CLINIC | Age: 57
End: 2022-03-22

## 2022-03-22 VITALS
BODY MASS INDEX: 24.71 KG/M2 | HEART RATE: 65 BPM | TEMPERATURE: 97.3 F | SYSTOLIC BLOOD PRESSURE: 118 MMHG | WEIGHT: 157.4 LBS | DIASTOLIC BLOOD PRESSURE: 64 MMHG | RESPIRATION RATE: 18 BRPM | HEIGHT: 67 IN | OXYGEN SATURATION: 99 %

## 2022-03-22 DIAGNOSIS — Z87.2 HISTORY OF EXCISION OF EPIDERMAL INCLUSION CYST: Primary | ICD-10-CM

## 2022-03-22 DIAGNOSIS — Z98.890 HISTORY OF EXCISION OF EPIDERMAL INCLUSION CYST: Primary | ICD-10-CM

## 2022-03-22 PROCEDURE — 99024 POSTOP FOLLOW-UP VISIT: CPT | Performed by: SURGERY

## 2022-03-30 NOTE — OP NOTE
PROCEDURE DATE: 3/11/2022    SURGEON: Mayela Pastor MD, FACS    PREOPERATIVE DIAGNOSIS: Perianal cyst [K62.89]    POSTOPERATIVE DIAGNOSIS: same    PROCEDURE: Perianal cyst excision    ANESTHESIA: MAC    EBL: 5mL    SPECIMENS:     ID Source Type Tests Collected By Collected At Frozen?    A Perirectal Abscess Tissue · TISSUE PATHOLOGY EXAM   Kaylynn Yanez, RN 3/11/22 1202 No    Description: perianal cyst    This specimen was not marked as sent.       INDICATIONS FOR THE PROCEDURE:  Ms. Canas is a 56-year-old female patient who was recently evaluated my office for a cystic lesion near the coccyx, present for the last 2 months which is painful.  On examination she was found to have a cystic lesion of the perianal region of uncertain etiology.  Excision was recommended for definitive evaluation and management.  The patient was counseled accordingly and apprised of the risks, benefits, and alternatives to the proposed procedure.  We discussed the differential diagnosis for this lesion, and the differences in treatment for each of the variety of cystic lesions that could be found.  The patient wished to proceed with excision for definitive management purposes.  She is now being brought to the operating room for the same.    DESCRIPTION OF THE PROCEDURE: The patient was seen and examined preoperatively on the day of the surgical procedure.  Her history and physical examination was updated as appropriate.  She was then taken to the operating room and placed supine on the operating table, where a timeout was performed using the WHO checklist.  The patient received appropriate preoperative antibiotics, and simultaneous heparin for DVT prophylaxis.  Following satisfactory induction of anesthesia, the patient was transitioned to the modified lithotomy position with yellowfin stirrups.  The entire perianal region was prepped and draped in the usual sterile fashion.  The area of concern was preemptively anesthetized  with lidocaine.  Of note, the area of concern was in the left perianal region, distal to the tip of the coccyx, at the position when the patient is in lithotomy, the 11 o'clock position when the patient is prone.  The cystic mass was reidentified, and an elliptical incision was fashioned with a 15 blade knife to encompass the entire cystic area.  This was deepened to the skin and the subcutaneous tissue, where an apparent ruptured cyst wall was encountered.  There was evidence of liquid contents with intermixed keratinaceous debris.  This was consistent with an epidermal inclusion cyst which had ruptured.  The entire cyst wall was then sharply excised circumferentially.  The contents were included in this excision.  All of this was subsequently passed off the field as specimen.  The remaining soft tissue was inspected and there was noted to be normal-appearing subcutaneous fat circumferentially.  Hemostasis was achieved with cautery, and attention was then turned to closure of the wound.  2 separate layers of interrupted 3-0 Vicryl were used to reapproximate to the subcutaneous tissue.  The skin was then closed with interrupted 3-0 nylon.  Additional 0.25% Marcaine was infiltrated around the wound for postoperative analgesia.  A dry sterile dressing was then placed over the wound and secured with mesh pants.     The patient was then returned to the supine position, awakened from anesthesia, taken the recovery room in good condition.  There were no complications.  All counts were correct at the conclusion of the procedure.

## 2022-04-05 PROBLEM — K62.89 PERIANAL CYST: Status: RESOLVED | Noted: 2022-03-10 | Resolved: 2022-04-05

## 2022-04-19 ENCOUNTER — OFFICE VISIT (OUTPATIENT)
Dept: SURGERY | Facility: CLINIC | Age: 57
End: 2022-04-19

## 2022-04-19 VITALS
BODY MASS INDEX: 24.39 KG/M2 | SYSTOLIC BLOOD PRESSURE: 110 MMHG | HEART RATE: 73 BPM | HEIGHT: 67 IN | DIASTOLIC BLOOD PRESSURE: 68 MMHG | TEMPERATURE: 97.4 F | OXYGEN SATURATION: 97 % | WEIGHT: 155.4 LBS

## 2022-04-19 DIAGNOSIS — K62.89 PERIANAL CYST: Primary | ICD-10-CM

## 2022-04-19 PROCEDURE — 99213 OFFICE O/P EST LOW 20 MIN: CPT | Performed by: SURGERY

## 2022-04-20 ENCOUNTER — TELEPHONE (OUTPATIENT)
Dept: SURGERY | Facility: CLINIC | Age: 57
End: 2022-04-20

## 2022-04-22 ENCOUNTER — TELEPHONE (OUTPATIENT)
Dept: SURGERY | Facility: CLINIC | Age: 57
End: 2022-04-22

## 2022-04-22 ENCOUNTER — PREP FOR SURGERY (OUTPATIENT)
Dept: OTHER | Facility: HOSPITAL | Age: 57
End: 2022-04-22

## 2022-04-22 DIAGNOSIS — K62.89 PERIANAL CYST: Primary | ICD-10-CM

## 2022-04-22 RX ORDER — SODIUM CHLORIDE 0.9 % (FLUSH) 0.9 %
10 SYRINGE (ML) INJECTION EVERY 12 HOURS SCHEDULED
Status: CANCELLED | OUTPATIENT
Start: 2022-04-22

## 2022-04-22 RX ORDER — SODIUM CHLORIDE, SODIUM LACTATE, POTASSIUM CHLORIDE, CALCIUM CHLORIDE 600; 310; 30; 20 MG/100ML; MG/100ML; MG/100ML; MG/100ML
50 INJECTION, SOLUTION INTRAVENOUS CONTINUOUS
Status: CANCELLED | OUTPATIENT
Start: 2022-04-22

## 2022-04-22 RX ORDER — SODIUM CHLORIDE 0.9 % (FLUSH) 0.9 %
10 SYRINGE (ML) INJECTION AS NEEDED
Status: CANCELLED | OUTPATIENT
Start: 2022-04-22

## 2022-04-22 RX ORDER — HEPARIN SODIUM 5000 [USP'U]/ML
5000 INJECTION, SOLUTION INTRAVENOUS; SUBCUTANEOUS ONCE
Status: CANCELLED | OUTPATIENT
Start: 2022-04-22 | End: 2022-04-22

## 2022-05-05 ENCOUNTER — TELEPHONE (OUTPATIENT)
Dept: SURGERY | Facility: CLINIC | Age: 57
End: 2022-05-05

## 2022-05-10 ENCOUNTER — ANESTHESIA (OUTPATIENT)
Dept: PERIOP | Facility: HOSPITAL | Age: 57
End: 2022-05-10

## 2022-05-10 ENCOUNTER — ANESTHESIA EVENT (OUTPATIENT)
Dept: PERIOP | Facility: HOSPITAL | Age: 57
End: 2022-05-10

## 2022-05-10 ENCOUNTER — HOSPITAL ENCOUNTER (OUTPATIENT)
Facility: HOSPITAL | Age: 57
Setting detail: HOSPITAL OUTPATIENT SURGERY
Discharge: HOME | End: 2022-05-10
Attending: SURGERY | Admitting: SURGERY

## 2022-05-10 VITALS
BODY MASS INDEX: 24.33 KG/M2 | TEMPERATURE: 96.8 F | WEIGHT: 155 LBS | HEIGHT: 67 IN | HEART RATE: 59 BPM | DIASTOLIC BLOOD PRESSURE: 60 MMHG | SYSTOLIC BLOOD PRESSURE: 112 MMHG | RESPIRATION RATE: 18 BRPM | OXYGEN SATURATION: 98 %

## 2022-05-10 DIAGNOSIS — K62.89 PERIANAL CYST: ICD-10-CM

## 2022-05-10 PROCEDURE — 25010000002 DEXAMETHASONE PER 1 MG: Performed by: NURSE ANESTHETIST, CERTIFIED REGISTERED

## 2022-05-10 PROCEDURE — 11423 EXC H-F-NK-SP B9+MARG 2.1-3: CPT | Performed by: SURGERY

## 2022-05-10 PROCEDURE — 25010000002 ONDANSETRON PER 1 MG: Performed by: NURSE ANESTHETIST, CERTIFIED REGISTERED

## 2022-05-10 PROCEDURE — 0 AMPICILLIN-SULBACTAM PER 1.5 G: Performed by: SURGERY

## 2022-05-10 PROCEDURE — 25010000002 PROPOFOL 10 MG/ML EMULSION: Performed by: NURSE ANESTHETIST, CERTIFIED REGISTERED

## 2022-05-10 PROCEDURE — 25010000002 HEPARIN (PORCINE) PER 1000 UNITS: Performed by: SURGERY

## 2022-05-10 PROCEDURE — 11422 EXC H-F-NK-SP B9+MARG 1.1-2: CPT | Performed by: SURGERY

## 2022-05-10 PROCEDURE — 25010000002 FENTANYL CITRATE (PF) 50 MCG/ML SOLUTION: Performed by: NURSE ANESTHETIST, CERTIFIED REGISTERED

## 2022-05-10 PROCEDURE — 0 LIDOCAINE 1 % SOLUTION: Performed by: SURGERY

## 2022-05-10 PROCEDURE — 25010000002 MIDAZOLAM PER 1MG: Performed by: NURSE ANESTHETIST, CERTIFIED REGISTERED

## 2022-05-10 RX ORDER — ONDANSETRON 4 MG/1
4 TABLET, ORALLY DISINTEGRATING ORAL EVERY 8 HOURS PRN
Qty: 12 TABLET | Refills: 0 | OUTPATIENT
Start: 2022-05-10 | End: 2022-08-31

## 2022-05-10 RX ORDER — HYDROCODONE BITARTRATE AND ACETAMINOPHEN 5; 325 MG/1; MG/1
1 TABLET ORAL ONCE AS NEEDED
Status: DISCONTINUED | OUTPATIENT
Start: 2022-05-10 | End: 2022-05-10 | Stop reason: HOSPADM

## 2022-05-10 RX ORDER — ONDANSETRON 2 MG/ML
4 INJECTION INTRAMUSCULAR; INTRAVENOUS ONCE AS NEEDED
Status: DISCONTINUED | OUTPATIENT
Start: 2022-05-10 | End: 2022-05-10 | Stop reason: HOSPADM

## 2022-05-10 RX ORDER — MIDAZOLAM HYDROCHLORIDE 2 MG/2ML
INJECTION, SOLUTION INTRAMUSCULAR; INTRAVENOUS AS NEEDED
Status: DISCONTINUED | OUTPATIENT
Start: 2022-05-10 | End: 2022-05-10 | Stop reason: SURG

## 2022-05-10 RX ORDER — DEXAMETHASONE SODIUM PHOSPHATE 4 MG/ML
INJECTION, SOLUTION INTRA-ARTICULAR; INTRALESIONAL; INTRAMUSCULAR; INTRAVENOUS; SOFT TISSUE AS NEEDED
Status: DISCONTINUED | OUTPATIENT
Start: 2022-05-10 | End: 2022-05-10 | Stop reason: SURG

## 2022-05-10 RX ORDER — LIDOCAINE HYDROCHLORIDE 20 MG/ML
INJECTION, SOLUTION INTRAVENOUS AS NEEDED
Status: DISCONTINUED | OUTPATIENT
Start: 2022-05-10 | End: 2022-05-10 | Stop reason: SURG

## 2022-05-10 RX ORDER — SODIUM CHLORIDE 0.9 % (FLUSH) 0.9 %
10 SYRINGE (ML) INJECTION AS NEEDED
Status: DISCONTINUED | OUTPATIENT
Start: 2022-05-10 | End: 2022-05-10 | Stop reason: HOSPADM

## 2022-05-10 RX ORDER — BACITRACIN, NEOMYCIN, POLYMYXIN B 400; 3.5; 5 [USP'U]/G; MG/G; [USP'U]/G
OINTMENT TOPICAL AS NEEDED
Status: DISCONTINUED | OUTPATIENT
Start: 2022-05-10 | End: 2022-05-10 | Stop reason: HOSPADM

## 2022-05-10 RX ORDER — SODIUM CHLORIDE 0.9 % (FLUSH) 0.9 %
10 SYRINGE (ML) INJECTION EVERY 12 HOURS SCHEDULED
Status: DISCONTINUED | OUTPATIENT
Start: 2022-05-10 | End: 2022-05-10 | Stop reason: HOSPADM

## 2022-05-10 RX ORDER — ONDANSETRON 2 MG/ML
INJECTION INTRAMUSCULAR; INTRAVENOUS AS NEEDED
Status: DISCONTINUED | OUTPATIENT
Start: 2022-05-10 | End: 2022-05-10 | Stop reason: SURG

## 2022-05-10 RX ORDER — SCOLOPAMINE TRANSDERMAL SYSTEM 1 MG/1
1 PATCH, EXTENDED RELEASE TRANSDERMAL ONCE
Status: DISCONTINUED | OUTPATIENT
Start: 2022-05-10 | End: 2022-05-10 | Stop reason: HOSPADM

## 2022-05-10 RX ORDER — HYDROCODONE BITARTRATE AND ACETAMINOPHEN 7.5; 325 MG/1; MG/1
1 TABLET ORAL EVERY 4 HOURS PRN
Qty: 10 TABLET | Refills: 0 | OUTPATIENT
Start: 2022-05-10 | End: 2022-08-31

## 2022-05-10 RX ORDER — HEPARIN SODIUM 5000 [USP'U]/ML
5000 INJECTION, SOLUTION INTRAVENOUS; SUBCUTANEOUS ONCE
Status: COMPLETED | OUTPATIENT
Start: 2022-05-10 | End: 2022-05-10

## 2022-05-10 RX ORDER — LIDOCAINE HYDROCHLORIDE 10 MG/ML
INJECTION, SOLUTION INFILTRATION; PERINEURAL AS NEEDED
Status: DISCONTINUED | OUTPATIENT
Start: 2022-05-10 | End: 2022-05-10 | Stop reason: HOSPADM

## 2022-05-10 RX ORDER — FENTANYL CITRATE 50 UG/ML
INJECTION, SOLUTION INTRAMUSCULAR; INTRAVENOUS AS NEEDED
Status: DISCONTINUED | OUTPATIENT
Start: 2022-05-10 | End: 2022-05-10 | Stop reason: SURG

## 2022-05-10 RX ORDER — SODIUM CHLORIDE, SODIUM LACTATE, POTASSIUM CHLORIDE, CALCIUM CHLORIDE 600; 310; 30; 20 MG/100ML; MG/100ML; MG/100ML; MG/100ML
50 INJECTION, SOLUTION INTRAVENOUS CONTINUOUS
Status: DISCONTINUED | OUTPATIENT
Start: 2022-05-10 | End: 2022-05-10 | Stop reason: HOSPADM

## 2022-05-10 RX ORDER — BUPIVACAINE HYDROCHLORIDE 2.5 MG/ML
INJECTION, SOLUTION EPIDURAL; INFILTRATION; INTRACAUDAL AS NEEDED
Status: DISCONTINUED | OUTPATIENT
Start: 2022-05-10 | End: 2022-05-10 | Stop reason: HOSPADM

## 2022-05-10 RX ADMIN — PROPOFOL 100 MCG/KG/MIN: 10 INJECTION, EMULSION INTRAVENOUS at 14:01

## 2022-05-10 RX ADMIN — FENTANYL CITRATE 50 MCG: 50 INJECTION INTRAMUSCULAR; INTRAVENOUS at 14:06

## 2022-05-10 RX ADMIN — SCOPALAMINE 1 PATCH: 1 PATCH, EXTENDED RELEASE TRANSDERMAL at 13:10

## 2022-05-10 RX ADMIN — MIDAZOLAM HYDROCHLORIDE 2 MG: 1 INJECTION, SOLUTION INTRAMUSCULAR; INTRAVENOUS at 13:55

## 2022-05-10 RX ADMIN — HEPARIN SODIUM 5000 UNITS: 5000 INJECTION INTRAVENOUS; SUBCUTANEOUS at 13:46

## 2022-05-10 RX ADMIN — LIDOCAINE HYDROCHLORIDE 60 MG: 20 INJECTION, SOLUTION INTRAVENOUS at 14:01

## 2022-05-10 RX ADMIN — AMPICILLIN SODIUM AND SULBACTAM SODIUM 3 G: 2; 1 INJECTION, POWDER, FOR SOLUTION INTRAVENOUS at 14:02

## 2022-05-10 RX ADMIN — DEXAMETHASONE SODIUM PHOSPHATE 4 MG: 4 INJECTION, SOLUTION INTRAMUSCULAR; INTRAVENOUS at 14:50

## 2022-05-10 RX ADMIN — SODIUM CHLORIDE, POTASSIUM CHLORIDE, SODIUM LACTATE AND CALCIUM CHLORIDE 50 ML/HR: 600; 310; 30; 20 INJECTION, SOLUTION INTRAVENOUS at 12:58

## 2022-05-10 RX ADMIN — ONDANSETRON 4 MG: 2 INJECTION INTRAMUSCULAR; INTRAVENOUS at 14:50

## 2022-05-10 NOTE — ANESTHESIA PREPROCEDURE EVALUATION
Anesthesia Evaluation     Patient summary reviewed and Nursing notes reviewed   history of anesthetic complications: PONV  NPO Solid Status: > 8 hours  NPO Liquid Status: > 8 hours           Airway   Mallampati: II  TM distance: >3 FB  Neck ROM: full  no difficulty expected  Dental - normal exam     Pulmonary    (+) a smoker Former, decreased breath sounds,   Cardiovascular - normal exam    Rhythm: regular  Rate: normal    (+) PVD, hyperlipidemia,       Neuro/Psych- negative ROS  GI/Hepatic/Renal/Endo    (+)  GERD,      Musculoskeletal     (+) arthralgias, myalgias,   Abdominal    Substance History   (+) alcohol use,      OB/GYN negative ob/gyn ROS         Other   arthritis,                        Anesthesia Plan    ASA 2     MAC   (Pt told that intravenous sedation will be used as the primary anesthetic along with local anesthesia if necessary. Every effort will be made to make sure the patient is comfortable.     The patient was told they may or may not have recall for the procedure. It was further explained that if the MAC was not adequate that a general anesthetic with either an LMA or endotracheal tube would be required.     Will proceed with the plan of care.)  intravenous induction     Anesthetic plan, all risks, benefits, and alternatives have been provided, discussed and informed consent has been obtained with: patient.        CODE STATUS:

## 2022-05-10 NOTE — ANESTHESIA POSTPROCEDURE EVALUATION
Patient: Tere MCGRATH    Procedure Summary     Date: 05/10/22 Room / Location: [facility] OR 3 /  [facility]    Anesthesia Start: 1350 Anesthesia Stop: 1503    Procedure: PERIANAL CYST EXCISION (N/A ) Diagnosis:       Perianal cyst      (Perianal cyst [K62.89])    Surgeons: Mayela Pastor MD Provider: Diane Knott CRNA    Anesthesia Type: MAC ASA Status: 2          Anesthesia Type: MAC    Vitals  No vitals data found for the desired time range.          Post Anesthesia Care and Evaluation    Patient location during evaluation: bedside  Patient participation: complete - patient participated  Level of consciousness: awake  Pain score: 0  Pain management: adequate  Airway patency: patent  Anesthetic complications: No anesthetic complications  PONV Status: controlled  Cardiovascular status: acceptable and stable  Respiratory status: acceptable and room air  Hydration status: acceptable

## 2022-05-11 NOTE — ANESTHESIA POSTPROCEDURE EVALUATION
Patient: Tere MCGRATH    Procedure Summary     Date: 05/10/22 Room / Location: [facility] OR 3 /  [facility]    Anesthesia Start: 1350 Anesthesia Stop: 1503    Procedure: PERIANAL CYST EXCISION (N/A ) Diagnosis:       Perianal cyst      (Perianal cyst [K62.89])    Surgeons: Mayela Pastor MD Provider: Diane Knott CRNA    Anesthesia Type: MAC ASA Status: 2          Anesthesia Type: MAC    Vitals  Vitals Value Taken Time   /60 05/10/22 1535   Temp 96.8 °F (36 °C) 05/10/22 1504   Pulse 59 05/10/22 1535   Resp 18 05/10/22 1535   SpO2 98 % 05/10/22 1535           Post Anesthesia Care and Evaluation    Patient location during evaluation: PHASE II  Patient participation: complete - patient participated  Level of consciousness: awake and alert  Pain score: 0  Pain management: satisfactory to patient  Airway patency: patent  Anesthetic complications: No anesthetic complications  PONV Status: none  Cardiovascular status: acceptable and stable  Respiratory status: acceptable  Hydration status: acceptable    Comments: Vitals signs as noted in nursing documentation as per protocol.

## 2022-05-12 LAB — REF LAB TEST METHOD: NORMAL

## 2022-05-18 ENCOUNTER — OFFICE VISIT (OUTPATIENT)
Dept: SURGERY | Facility: CLINIC | Age: 57
End: 2022-05-18

## 2022-05-18 VITALS
WEIGHT: 154.6 LBS | HEIGHT: 67 IN | BODY MASS INDEX: 24.27 KG/M2 | TEMPERATURE: 97.1 F | RESPIRATION RATE: 18 BRPM | SYSTOLIC BLOOD PRESSURE: 128 MMHG | HEART RATE: 90 BPM | OXYGEN SATURATION: 97 % | DIASTOLIC BLOOD PRESSURE: 64 MMHG

## 2022-05-18 DIAGNOSIS — K62.89 PERIANAL CYST: Primary | ICD-10-CM

## 2022-05-18 PROCEDURE — 99024 POSTOP FOLLOW-UP VISIT: CPT | Performed by: SURGERY

## 2022-05-18 RX ORDER — SPIRONOLACTONE 50 MG/1
50 TABLET, FILM COATED ORAL DAILY
COMMUNITY
Start: 2022-05-11

## 2022-08-31 ENCOUNTER — APPOINTMENT (OUTPATIENT)
Dept: CT IMAGING | Facility: HOSPITAL | Age: 57
End: 2022-08-31

## 2022-08-31 ENCOUNTER — HOSPITAL ENCOUNTER (EMERGENCY)
Facility: HOSPITAL | Age: 57
Discharge: HOME | End: 2022-08-31
Attending: EMERGENCY MEDICINE | Admitting: EMERGENCY MEDICINE

## 2022-08-31 VITALS
HEIGHT: 67 IN | DIASTOLIC BLOOD PRESSURE: 78 MMHG | RESPIRATION RATE: 14 BRPM | HEART RATE: 84 BPM | TEMPERATURE: 98.7 F | WEIGHT: 158 LBS | SYSTOLIC BLOOD PRESSURE: 119 MMHG | OXYGEN SATURATION: 97 % | BODY MASS INDEX: 24.8 KG/M2

## 2022-08-31 DIAGNOSIS — K57.92 DIVERTICULITIS: Primary | ICD-10-CM

## 2022-08-31 LAB
ALBUMIN SERPL-MCNC: 4.3 G/DL (ref 3.5–5.2)
ALBUMIN/GLOB SERPL: 1.4 G/DL
ALP SERPL-CCNC: 76 U/L (ref 39–117)
ALT SERPL W P-5'-P-CCNC: 24 U/L (ref 1–33)
ANION GAP SERPL CALCULATED.3IONS-SCNC: 10.8 MMOL/L (ref 5–15)
AST SERPL-CCNC: 15 U/L (ref 1–32)
BACTERIA UR QL AUTO: ABNORMAL /HPF
BASOPHILS # BLD AUTO: 0.07 10*3/MM3 (ref 0–0.2)
BASOPHILS NFR BLD AUTO: 0.4 % (ref 0–1.5)
BILIRUB SERPL-MCNC: 0.6 MG/DL (ref 0–1.2)
BILIRUB UR QL STRIP: NEGATIVE
BUN SERPL-MCNC: 15 MG/DL (ref 6–20)
BUN/CREAT SERPL: 17 (ref 7–25)
CALCIUM SPEC-SCNC: 10.1 MG/DL (ref 8.6–10.5)
CHLORIDE SERPL-SCNC: 101 MMOL/L (ref 98–107)
CLARITY UR: CLEAR
CO2 SERPL-SCNC: 25.2 MMOL/L (ref 22–29)
COLOR UR: ABNORMAL
CREAT SERPL-MCNC: 0.88 MG/DL (ref 0.57–1)
D-LACTATE SERPL-SCNC: 1.4 MMOL/L (ref 0.5–2)
DEPRECATED RDW RBC AUTO: 40.1 FL (ref 37–54)
EGFRCR SERPLBLD CKD-EPI 2021: 77.2 ML/MIN/1.73
EOSINOPHIL # BLD AUTO: 0.07 10*3/MM3 (ref 0–0.4)
EOSINOPHIL NFR BLD AUTO: 0.4 % (ref 0.3–6.2)
ERYTHROCYTE [DISTWIDTH] IN BLOOD BY AUTOMATED COUNT: 12 % (ref 12.3–15.4)
GLOBULIN UR ELPH-MCNC: 3.1 GM/DL
GLUCOSE SERPL-MCNC: 143 MG/DL (ref 65–99)
GLUCOSE UR STRIP-MCNC: NEGATIVE MG/DL
HCT VFR BLD AUTO: 40.2 % (ref 34–46.6)
HGB BLD-MCNC: 14 G/DL (ref 12–15.9)
HGB UR QL STRIP.AUTO: NEGATIVE
HOLD SPECIMEN: NORMAL
HOLD SPECIMEN: NORMAL
HYALINE CASTS UR QL AUTO: ABNORMAL /LPF
IMM GRANULOCYTES # BLD AUTO: 0.07 10*3/MM3 (ref 0–0.05)
IMM GRANULOCYTES NFR BLD AUTO: 0.4 % (ref 0–0.5)
KETONES UR QL STRIP: ABNORMAL
LEUKOCYTE ESTERASE UR QL STRIP.AUTO: ABNORMAL
LIPASE SERPL-CCNC: 19 U/L (ref 13–60)
LYMPHOCYTES # BLD AUTO: 1.78 10*3/MM3 (ref 0.7–3.1)
LYMPHOCYTES NFR BLD AUTO: 10.8 % (ref 19.6–45.3)
MCH RBC QN AUTO: 31.7 PG (ref 26.6–33)
MCHC RBC AUTO-ENTMCNC: 34.8 G/DL (ref 31.5–35.7)
MCV RBC AUTO: 91 FL (ref 79–97)
MONOCYTES # BLD AUTO: 0.76 10*3/MM3 (ref 0.1–0.9)
MONOCYTES NFR BLD AUTO: 4.6 % (ref 5–12)
NEUTROPHILS NFR BLD AUTO: 13.72 10*3/MM3 (ref 1.7–7)
NEUTROPHILS NFR BLD AUTO: 83.4 % (ref 42.7–76)
NITRITE UR QL STRIP: POSITIVE
NRBC BLD AUTO-RTO: 0 /100 WBC (ref 0–0.2)
PH UR STRIP.AUTO: <=5 [PH] (ref 5–8)
PLATELET # BLD AUTO: 315 10*3/MM3 (ref 140–450)
PMV BLD AUTO: 10.1 FL (ref 6–12)
POTASSIUM SERPL-SCNC: 4.1 MMOL/L (ref 3.5–5.2)
PROT SERPL-MCNC: 7.4 G/DL (ref 6–8.5)
PROT UR QL STRIP: ABNORMAL
RBC # BLD AUTO: 4.42 10*6/MM3 (ref 3.77–5.28)
RBC # UR STRIP: ABNORMAL /HPF
REF LAB TEST METHOD: ABNORMAL
SODIUM SERPL-SCNC: 137 MMOL/L (ref 136–145)
SP GR UR STRIP: 1.02 (ref 1–1.03)
SQUAMOUS #/AREA URNS HPF: ABNORMAL /HPF
UROBILINOGEN UR QL STRIP: ABNORMAL
WBC # UR STRIP: ABNORMAL /HPF
WBC NRBC COR # BLD: 16.47 10*3/MM3 (ref 3.4–10.8)
WHOLE BLOOD HOLD COAG: NORMAL
WHOLE BLOOD HOLD SPECIMEN: NORMAL

## 2022-08-31 PROCEDURE — 81001 URINALYSIS AUTO W/SCOPE: CPT | Performed by: EMERGENCY MEDICINE

## 2022-08-31 PROCEDURE — 99283 EMERGENCY DEPT VISIT LOW MDM: CPT

## 2022-08-31 PROCEDURE — 96375 TX/PRO/DX INJ NEW DRUG ADDON: CPT

## 2022-08-31 PROCEDURE — 80053 COMPREHEN METABOLIC PANEL: CPT | Performed by: EMERGENCY MEDICINE

## 2022-08-31 PROCEDURE — 83690 ASSAY OF LIPASE: CPT | Performed by: EMERGENCY MEDICINE

## 2022-08-31 PROCEDURE — 74176 CT ABD & PELVIS W/O CONTRAST: CPT

## 2022-08-31 PROCEDURE — 85025 COMPLETE CBC W/AUTO DIFF WBC: CPT | Performed by: EMERGENCY MEDICINE

## 2022-08-31 PROCEDURE — 25010000002 KETOROLAC TROMETHAMINE PER 15 MG: Performed by: EMERGENCY MEDICINE

## 2022-08-31 PROCEDURE — 83605 ASSAY OF LACTIC ACID: CPT | Performed by: EMERGENCY MEDICINE

## 2022-08-31 PROCEDURE — 25010000002 ONDANSETRON PER 1 MG: Performed by: EMERGENCY MEDICINE

## 2022-08-31 PROCEDURE — 96374 THER/PROPH/DIAG INJ IV PUSH: CPT

## 2022-08-31 RX ORDER — AMOXICILLIN AND CLAVULANATE POTASSIUM 875; 125 MG/1; MG/1
1 TABLET, FILM COATED ORAL 2 TIMES DAILY
Qty: 14 TABLET | Refills: 0 | Status: SHIPPED | OUTPATIENT
Start: 2022-08-31

## 2022-08-31 RX ORDER — ONDANSETRON 4 MG/1
4 TABLET, ORALLY DISINTEGRATING ORAL 4 TIMES DAILY PRN
Qty: 20 TABLET | Refills: 0 | Status: SHIPPED | OUTPATIENT
Start: 2022-08-31

## 2022-08-31 RX ORDER — ONDANSETRON 2 MG/ML
4 INJECTION INTRAMUSCULAR; INTRAVENOUS ONCE
Status: COMPLETED | OUTPATIENT
Start: 2022-08-31 | End: 2022-08-31

## 2022-08-31 RX ORDER — HYDROCODONE BITARTRATE AND ACETAMINOPHEN 5; 325 MG/1; MG/1
1 TABLET ORAL EVERY 6 HOURS PRN
Qty: 10 TABLET | Refills: 0 | Status: SHIPPED | OUTPATIENT
Start: 2022-08-31

## 2022-08-31 RX ORDER — KETOROLAC TROMETHAMINE 30 MG/ML
30 INJECTION, SOLUTION INTRAMUSCULAR; INTRAVENOUS ONCE
Status: COMPLETED | OUTPATIENT
Start: 2022-08-31 | End: 2022-08-31

## 2022-08-31 RX ORDER — NITROFURANTOIN 25; 75 MG/1; MG/1
100 CAPSULE ORAL 2 TIMES DAILY
COMMUNITY

## 2022-08-31 RX ADMIN — KETOROLAC TROMETHAMINE 30 MG: 30 INJECTION, SOLUTION INTRAMUSCULAR; INTRAVENOUS at 05:27

## 2022-08-31 RX ADMIN — ONDANSETRON 4 MG: 2 INJECTION INTRAMUSCULAR; INTRAVENOUS at 05:27

## 2023-02-10 ENCOUNTER — TRANSCRIBE ORDERS (OUTPATIENT)
Dept: ADMINISTRATIVE | Facility: HOSPITAL | Age: 58
End: 2023-02-10
Payer: COMMERCIAL

## 2023-02-10 DIAGNOSIS — Z12.31 SCREENING MAMMOGRAM, ENCOUNTER FOR: Primary | ICD-10-CM

## 2023-05-22 ENCOUNTER — HOSPITAL ENCOUNTER (OUTPATIENT)
Dept: MAMMOGRAPHY | Facility: HOSPITAL | Age: 58
Discharge: HOME | End: 2023-05-22
Admitting: FAMILY MEDICINE
Payer: COMMERCIAL

## 2023-05-22 DIAGNOSIS — Z12.31 SCREENING MAMMOGRAM, ENCOUNTER FOR: ICD-10-CM

## 2023-05-22 PROCEDURE — 77063 BREAST TOMOSYNTHESIS BI: CPT

## 2023-05-22 PROCEDURE — 77067 SCR MAMMO BI INCL CAD: CPT

## 2024-04-30 ENCOUNTER — TRANSCRIBE ORDERS (OUTPATIENT)
Dept: ADMINISTRATIVE | Facility: HOSPITAL | Age: 59
End: 2024-04-30
Payer: COMMERCIAL

## 2024-04-30 DIAGNOSIS — Z12.31 ENCOUNTER FOR SCREENING MAMMOGRAM FOR MALIGNANT NEOPLASM OF BREAST: Primary | ICD-10-CM

## 2024-07-30 ENCOUNTER — OFFICE VISIT (OUTPATIENT)
Dept: OBSTETRICS AND GYNECOLOGY | Facility: CLINIC | Age: 59
End: 2024-07-30
Payer: COMMERCIAL

## 2024-07-30 VITALS
WEIGHT: 136 LBS | SYSTOLIC BLOOD PRESSURE: 112 MMHG | BODY MASS INDEX: 20.61 KG/M2 | DIASTOLIC BLOOD PRESSURE: 70 MMHG | HEIGHT: 68 IN

## 2024-07-30 DIAGNOSIS — Z01.411 ENCOUNTER FOR GYNECOLOGICAL EXAMINATION (GENERAL) (ROUTINE) WITH ABNORMAL FINDINGS: Primary | ICD-10-CM

## 2024-07-30 DIAGNOSIS — Z12.31 ENCOUNTER FOR SCREENING MAMMOGRAM FOR BREAST CANCER: ICD-10-CM

## 2024-07-30 DIAGNOSIS — N95.2 POSTMENOPAUSAL ATROPHIC VAGINITIS: ICD-10-CM

## 2024-07-30 PROCEDURE — 99459 PELVIC EXAMINATION: CPT | Performed by: OBSTETRICS & GYNECOLOGY

## 2024-07-30 PROCEDURE — 99386 PREV VISIT NEW AGE 40-64: CPT | Performed by: OBSTETRICS & GYNECOLOGY

## 2024-07-30 NOTE — PROGRESS NOTES
Chief Complaint  Gynecologic Exam (Last pap 06/10/20 WNL, no complaints.)     History of Present Illness:  Patient is 59 y.o.  who presents to [facility] here for her annual examination.  Patient went through menopause at the age of 46.  She has had no further bleeding since that time.  She still has occasional hot flashes and night sweats.  She denies any significant symptoms.  She does have her mammogram scheduled.  She sees Dr. Noriega for her primary care.  She did do Cologuard last year.    History  Past Medical History:   Diagnosis Date    Arthritis     Ear piercing     Elevated cholesterol     GERD (gastroesophageal reflux disease)     Hyperlipidemia     Migraine     Perianal cyst 03/10/2022    Added automatically from request for surgery [id number]    PONV (postoperative nausea and vomiting)     Seasonal allergies     Urinary tract infection     Wears glasses      Current Outpatient Medications on File Prior to Visit   Medication Sig Dispense Refill    amoxicillin-clavulanate (AUGMENTIN) 875-125 MG per tablet Take 1 tablet by mouth 2 (Two) Times a Day. 14 tablet 0    Ascorbic Acid (VITAMIN C PO) Take 1 tablet by mouth Daily.      COD LIVER OIL PO Take  by mouth.      HYDROcodone-acetaminophen (NORCO) 5-325 MG per tablet Take 1 tablet by mouth Every 6 (Six) Hours As Needed for Severe Pain. 10 tablet 0    hydroquinone 4 % cream Apply 1 Application topically to the appropriate area as directed 2 (Two) Times a Day As Needed.      nitrofurantoin, macrocrystal-monohydrate, (MACROBID) 100 MG capsule Take 1 capsule by mouth 2 (Two) Times a Day. Started       ondansetron ODT (ZOFRAN-ODT) 4 MG disintegrating tablet Place 1 tablet on the tongue 4 (Four) Times a Day As Needed for Nausea. 20 tablet 0    spironolactone (ALDACTONE) 50 MG tablet Take 1 tablet by mouth Daily.      tretinoin (RETIN-A) 0.025 % cream Apply 1 Application topically to the appropriate area as directed Every Night.   "    vitamin D3 125 MCG (5000 UT) capsule capsule Take 1 capsule by mouth Daily.       No current facility-administered medications on file prior to visit.     No Known Allergies  Past Surgical History:   Procedure Laterality Date    COLONOSCOPY      D & C CERVICAL BIOPSY      ENDOSCOPY      PERIANAL LESION/CYST EXCISION Left 2022    Procedure: PERIANAL CYST EXCISION;  Surgeon: Mayela Pastor MD;  Location: [facility];  Service: General;  Laterality: Left;    PERIANAL LESION/CYST EXCISION N/A 05/10/2022    Procedure: PERIANAL CYST EXCISION;  Surgeon: Mayela Pastor MD;  Location: [facility] OR;  Service: General;  Laterality: N/A;    SKIN BIOPSY      WISDOM TOOTH EXTRACTION      top only     Family History   Problem Relation Age of Onset    Arthritis Mother     Hiatal hernia Mother     Atrial fibrillation Mother     Stroke Mother     Heart disease Father     Liver disease Son         fatty liver     Social History     Socioeconomic History    Marital status:    Tobacco Use    Smoking status: Former     Current packs/day: 0.00     Average packs/day: 1 pack/day for 5.0 years (5.0 ttl pk-yrs)     Types: Cigarettes     Start date:      Quit date:      Years since quittin.6    Smokeless tobacco: Never   Vaping Use    Vaping status: Never Used   Substance and Sexual Activity    Alcohol use: Yes     Comment: Occasionally     Drug use: No    Sexual activity: Yes     Partners: Male     Birth control/protection: Post-menopausal       Physical Examination:  Vital Signs: /70   Ht 171.5 cm (67.5\")   Wt 61.7 kg (136 lb)   BMI 20.99 kg/m²     General Appearance: alert, appears stated age, and cooperative  Breasts: Examined in supine position  Symmetric without masses or skin dimpling  Nipples normal without inversion, lesions or discharge  There are no palpable axillary nodes  Abdomen: no masses, no hepatomegaly, no splenomegaly, soft non-tender, no guarding, and no rebound " tenderness  Pelvic: Clinical staff was present for exam; pelvic examination required for evaluation.  External genitalia:  normal appearance of the external genitalia including Bartholin's and [place]'s glands.  :  urethral meatus normal;  Vaginal: mild atrophic changes noted  Cervix:  normal appearance.  Uterus:  normal size, shape and consistency.  Adnexa:  normal bimanual exam of the adnexa.  Pap smear done and specimen sent using Thin-Prep technique    Data Review:  The following data was reviewed by: Cherry Love MD on 07/30/2024:     Labs:    Imaging:  Mammo Screening Digital Tomosynthesis Bilateral With CAD (05/22/2023 09:53)   Medical Records:  None    Assessment and Plan   1. Encounter for gynecological examination (general) (routine) with abnormal findings  Pap was done today.  If she does not receive the results of the Pap within 2 weeks  time, she was instructed to call to find out the results.  I explained to Tere that the recommendations for Pap smear interval in a low risk patient has lengthened to 3 years time if cytology alone normal or  5 years time if both cytology and HPV testing were normal.  I encouraged her to be seen yearly for a full physical exam including breast and pelvic exam even during the off years when PAP's will not be performed.   - LIQUID-BASED PAP SMEAR WITH HPV GENOTYPING IF ASCUS ([facility])    2. Encounter for screening mammogram for breast cancer  It is recommended per ACOG, for women at average risk to start annual mammogram screening at the age of 40 until the age of 75 and an individualized decision be made for women after age 75.  She was encouraged to continue getting yearly mammograms.  She should report any palpable breast lump(s) or skin changes regardless of mammographic findings.  I explained to Tere that notification regarding her mammogram results will come from the center performing the study.  Our office will not be routinely calling with mammogram results.   It is her responsibility to make sure that the results from the mammogram are communicated to her by the breast center.  If she has any questions about the results, she is welcome to call our office anytime.  The patient reports she has mammogram scheduled.    Tere was counseled regarding having clinical breast exams and breast self-awareness.  Women aged 29-39 years of age should have clinical breast exams every 1-3 years and yearly aged 40 and older.  The patient was counseled regarding breast self-awareness focusing on having a sense of what is normal for her breasts so that she can tell if there are changes.  Even small changes should be reported to provider.     3. Postmenopausal atrophic vaginitis  Discussed various options for relief of atrophic vaginal symptoms related to menopause. Discussed local therapy for treatment of vaginal symptoms only.  Discussed the different formulation options including cream, ring, and tablets.  Discussed the low risk of systemic absorption in postmenopausal women with atrophy using 25 mcgs of estradiol on a daily basis.  Recommend low dose use 2-3x/wk for maintenance of treatment.  Other treatment options were discussed including the use of water-based and silicone-based vaginal lubricants and moisturizers.  Also discussed was the FDA approved treatment option of ospemifene for moderate to severe dyspareunia.   Pt to call if she desires any treatment.    Follow Up/Instructions:  Follow up as noted.  Patient was given instructions and counseling regarding her condition or for health maintenance advice. Please see specific information pulled into the AVS if appropriate.     Note: Speech recognition transcription software may have been used to dictate portions of this document.  An attempt at proofreading has been made though minor errors in transcription may still be present.    This note was electronically signed.  Cherry Love M.D.

## 2024-07-31 ENCOUNTER — PATIENT ROUNDING (BHMG ONLY) (OUTPATIENT)
Dept: OBSTETRICS AND GYNECOLOGY | Facility: CLINIC | Age: 59
End: 2024-07-31
Payer: COMMERCIAL

## 2024-07-31 NOTE — PROGRESS NOTES
July 31, 2024    Hello, may I speak with Tere MCGRATH?    My name is Haydee      I am  with [facility]  [address], [address]  [place] [address] [phone].    Before we get started may I verify your date of birth? 1965    I am calling to officially welcome you to our practice and ask about your recent visit. Is this a good time to talk? yes    Tell me about your visit with us. What things went well?  Patient states that her visit went well and that Dr Love is awesome.       We're always looking for ways to make our patients' experiences even better. Do you have recommendations on ways we may improve?  no    Overall were you satisfied with your first visit to our practice? yes       I appreciate you taking the time to speak with me today. Is there anything else I can do for you? no      Thank you, and have a great day.

## 2024-08-07 LAB — REF LAB TEST METHOD: NORMAL

## 2025-04-23 ENCOUNTER — TELEPHONE (OUTPATIENT)
Dept: OBSTETRICS AND GYNECOLOGY | Facility: CLINIC | Age: 60
End: 2025-04-23
Payer: COMMERCIAL

## 2025-04-23 RX ORDER — FLUCONAZOLE 150 MG/1
TABLET ORAL
Qty: 2 TABLET | Refills: 0 | Status: SHIPPED | OUTPATIENT
Start: 2025-04-23

## 2025-04-23 NOTE — TELEPHONE ENCOUNTER
"Provider: DR SNOW    Caller: Tere MCGRATH \"Julia\"    Relationship to Patient: Self    Pharmacy: [facility] [place]    Phone Number: [phone]    Reason for Call: PT CALLED ASKING FOR US TO LEAVE A MESSAGE WITH DR GWENDOLYN ANDINO'S NURSE. PT STATED SHE HAS A YEAST INFECTION AND WOULD LIKE DIFLUCAN CALLED INTO HER PHARMACY.    PLEASE CALL PT TO ADVISE.  "

## (undated) DEVICE — COUNT NDL FOAM STRIP W/MAG 60CT

## (undated) DEVICE — PAD,ABDOMINAL,5"X9",STERILE,LF,1/PK: Brand: MEDLINE INDUSTRIES, INC.

## (undated) DEVICE — ELECTRD BLD EZ CLN STD 2.5IN

## (undated) DEVICE — GLV SURG ULTRATOUCH BIOGEL/COAT PF LF SZ6 STRL

## (undated) DEVICE — PAD GRND REM POLYHESIVE A/ DISP

## (undated) DEVICE — NDL HYPO ECLPS SFTY 25G 1 1/2IN

## (undated) DEVICE — GLV SURG SENSICARE GREEN W/ALOE PF LF 6 STRL

## (undated) DEVICE — INTENDED FOR TISSUE SEPARATION, AND OTHER PROCEDURES THAT REQUIRE A SHARP SURGICAL BLADE TO PUNCTURE OR CUT.: Brand: BARD-PARKER ® CARBON RIB-BACK BLADES

## (undated) DEVICE — YANKAUER,BULB TIP, NO VENT: Brand: ARGYLE

## (undated) DEVICE — PANT KNIT WASHABLE 2XL/3XL GRY/PRP LF

## (undated) DEVICE — TUBING, SUCTION, 1/4" X 12', STRAIGHT: Brand: MEDLINE

## (undated) DEVICE — ANTIBACTERIAL UNDYED BRAIDED (POLYGLACTIN 910), SYNTHETIC ABSORBABLE SUTURE: Brand: COATED VICRYL

## (undated) DEVICE — SUT ETHLN 3-0 FS118IN 663H

## (undated) DEVICE — TP SXN YANKR BULB STRL

## (undated) DEVICE — PENCL ES MEGADINE EZ/CLEAN BUTN W/HOLSTR 10FT

## (undated) DEVICE — SPNG LAP 18X18IN LF STRL PK/5

## (undated) DEVICE — SUT GUT CHRM 3/0 SH 27IN G122H

## (undated) DEVICE — SUT ETHLN 4/0 PS2 18IN 1667H

## (undated) DEVICE — RICH MINOR LITHOTOMY: Brand: MEDLINE INDUSTRIES, INC.